# Patient Record
Sex: FEMALE | Race: WHITE | Employment: UNEMPLOYED | URBAN - NONMETROPOLITAN AREA
[De-identification: names, ages, dates, MRNs, and addresses within clinical notes are randomized per-mention and may not be internally consistent; named-entity substitution may affect disease eponyms.]

---

## 2022-09-21 ENCOUNTER — HOSPITAL ENCOUNTER (OUTPATIENT)
Dept: PHYSICAL THERAPY | Age: 77
Setting detail: THERAPIES SERIES
Discharge: HOME | End: 2022-09-21
Payer: MEDICAID

## 2022-09-21 PROCEDURE — 97140 MANUAL THERAPY 1/> REGIONS: CPT

## 2022-09-21 PROCEDURE — 97162 PT EVAL MOD COMPLEX 30 MIN: CPT

## 2022-09-21 NOTE — PROGRESS NOTES
(cm): 64.8  Mid Knee (cm): 61.6  Thigh (cm): 82.4  Total Girth (cm): 276.6         Functional Outcomes       The Lower Extremity Functional Scale  Any of your usual work, housework, or school activities: Quite a Bit of Difficulty  Your usual hobbies, recreational, or sporting activities: Quite a Bit of Difficulty  Getting into or out of the bath: Extreme Difficulty or Unable to Perform Activity  Walking between rooms: Extreme Difficulty or Unable to Perform Activity  Putting on your shoes or socks: Quite a Bit of Difficulty  Squatting: Extreme Difficulty or Unable to Perform Activity  Lifting an object, like a bag of groceries from the floor: Extreme Difficulty or Unable to Perform Activity  Performing light activities around your home: Extreme Difficulty or Unable to Perform Activity  Performing heavy activities around your home: Extreme Difficulty or Unable to Perform Activity  Getting into or out of a car: Extreme Difficulty or Unable to Perform Activity  Walking 2 blocks: Extreme Difficulty or Unable to Perform Activity  Walking a mile: Extreme Difficulty or Unable to Perform Activity  Going up or down 10 stairs (about 1 flight of stairs): Extreme Difficulty or Unable to Perform Activity  Standing for 1 hour: Extreme Difficulty or Unable to Perform Activity  Sitting for 1 hour: Moderate Difficulty  Running on even ground : Extreme Difficulty or Unable to Perform Activity  Running on uneven ground : Extreme Difficulty or Unable to Perform Activity  Making sharp turns while running fast : Extreme Difficulty or Unable to Perform Activity  Hopping: Extreme Difficulty or Unable to Perform Activity  Rolling over in bed: Quite a Bit of Difficulty  LEFS Total Score: 6      Exercises:  Exercise 1: HEP pt educated on keeping her legs elevated and compressed at 30mmHg, perform gentle exercises daily including ankle pumps, and reducing sodium and sugar intake    Manual:        Skin Integumentary  Skin Condition/Temp: Dry  Skin Integumentary  Skin Condition/Temp: Dry      Assessment  Assessment: Pt is a 68year old female that has a chronic history of BLE lymphedema with neuropathy and history of cellulitis. Pt will benefit from skilled PT in order to address these deficits. Therapy Prognosis: Fair        Decision Making: Medium Complexity    Patient Education  Patient Education: pt educated on her POC and HEP  Pt verbalized/demonstrated good understanding:     [x] Yes         [] No, pt required further clarification.          Goals  Short Term Goals  Time Frame for Short term goals: 3 weeks  Short term goal 1: Pt will be educated on her POC and HEP-met  Short term goal 2: Pt will initiate pump protocol    Long Term Goals  Time Frame for Long term goals : 6 weeks  Long term goal 1: Pt will be safe and independent with lymphedema management  Long term goal 2: Pt will demonstrate a 2-3cm decrease in BLE girth measurements in order to reduce any new or worsening skin conditions  Long term goal 3: Pt will be fitted for costume compression in order to management lymphedema  Long term goal 4: Pt will be fitted for an at home pump in order t o reduce BLE girth measurements and reduce pain      Patient goals : \"to decrease BLE swelling and reduce pain\"        Minutes Tracking:  Time In: 1304  Time Out: 1350  Minutes: 46  Timed Code Treatment Minutes: [address] [facility], PT, DPT   Date: 9/21/2022

## 2022-09-21 NOTE — PLAN OF CARE
[facility]           Phone: [phone]             Outpatient Physical Therapy  Fax: [phone]                                           Date: 2022  Patient: Rico Baker : 1945 CSN #: [id number]   Referring Physician: Brandi Avila DO      [x] Plan of Care   [] Updated Plan of Care    Dates of Service to Include: 2022 to 22    Diagnosis:  BLE lymphedema    Rehab (Treatment) Diagnosis:  BLE lymphedema             Onset Date:  22    Attendance  Total # of Visits to Date: 1        Assessment  Assessment: Pt is a 68year old female that has a chronic history of BLE lymphedema with neuropathy and history of cellulitis. Pt will benefit from skilled PT in order to address these deficits.       Goals  Short Term Goals  Time Frame for Short term goals: 3 weeks  Short term goal 1: Pt will be educated on her POC and HEP-met  Short term goal 2: Pt will initiate pump protocol  Long Term Goals  Time Frame for Long term goals : 6 weeks  Long term goal 1: Pt will be safe and independent with lymphedema management  Long term goal 2: Pt will demonstrate a 2-3cm decrease in BLE girth measurements in order to reduce any new or worsening skin conditions  Long term goal 3: Pt will be fitted for costume compression in order to management lymphedema  Long term goal 4: Pt will be fitted for an at home pump in order t o reduce BLE girth measurements and reduce pain     Prognosis  Therapy Prognosis: Fair    Treatment Plan   Plan Frequency: 2x/wk  Plan weeks: 4-6 weeks  [] HP/CP      [] Electrical Stim   [x] Therapeutic Exercise      [] Gait Training  [] Aquatics   [] Ultrasound         [x] Patient Education/HEP   [x] Manual Therapy  [] Traction    [] Neuro-gildardo        [] Soft Tissue Mobs            [] Home TENS  [] Iontophoresis    [] Orthotic casting/fitting      [x] Lymphedema              Electronically signed by: Adela Busch PT, DPT    Date: 9/21/2022      ______________________________________ Date: 9/21/2022   Physician Signature

## 2022-10-04 ENCOUNTER — HOSPITAL ENCOUNTER (OUTPATIENT)
Dept: PHYSICAL THERAPY | Age: 77
Setting detail: THERAPIES SERIES
Discharge: HOME | End: 2022-10-04
Payer: MEDICAID

## 2022-10-04 PROCEDURE — 97110 THERAPEUTIC EXERCISES: CPT

## 2022-10-04 PROCEDURE — 97140 MANUAL THERAPY 1/> REGIONS: CPT

## 2022-10-04 NOTE — PROGRESS NOTES
Phone: Andrei           Fax: [phone]                           Outpatient Physical Therapy                                                                            Daily Note    Patient: Hellen Burgess : 1945  CSN #: [id number]   Referring Physician: Marleni Pandya DO    Date: 10/4/2022       Treatment Diagnosis: BLE lymphedema    Onset Date: 22  PT Insurance Information: Medicaid  Total # of Visits Approved: 18 Per Physician Order  Total # of Visits to Date: 2  No Show: 0  Canceled Appointment: 0      Pre-Treatment Pain:  0/10  Subjective: Pt. denies pain upon arrival, reports she notices L LE swells more than R at times. Exercises:  Exercise 1: HEP pt educated on keeping her legs elevated and compressed at 30mmHg, perform gentle exercises daily including ankle pumps, and reducing sodium and sugar intake    Manual:  Soft Tissue Mobilizaton: MLD to B LEs    Modalities:       Assessment  Assessment: MLD to B LEs followed by compression pumps for 45' at 30mmHg. Pt. reports having pain/increased pressure in L thight region with increased time seated in chair. Activity Tolerance  Activity Tolerance: Patient tolerated treatment well    Patient Education  Patient Education: educated on pump protocol  Pt verbalized/demonstrated good understanding:     [x] Yes         [] No, pt required further clarification.        Post Treatment Pain:  110      Plan  Plan Frequency: 2x/wk  Plan weeks: 4-6 weeks       Goals  (Total # of Visits to Date: 2)      Short Term Goals  Time Frame for Short Term Goals: 3 weeks  Short Term Goal 1: Pt will be educated on her POC and HEP-met  Short Term Goal 2: Pt will initiate pump protocol    Long Term Goals  Time Frame for Long Term Goals : 6 weeks  Long Term Goal 1: Pt will be safe and independent with lymphedema management  Long Term Goal 2: Pt will demonstrate a 2-3cm decrease in BLE girth measurements in order to reduce any new or worsening skin conditions  Long Term Goal 3: Pt will be fitted for costume compression in order to management lymphedema  Long Term Goal 4: Pt will be fitted for an at home pump in order t o reduce BLE girth measurements and reduce pain    Minutes Tracking:  Time In: 1404  Time Out: [address]  Minutes: [address], PTA     Date: 10/4/2022

## 2022-10-12 ENCOUNTER — APPOINTMENT (OUTPATIENT)
Dept: PHYSICAL THERAPY | Age: 77
End: 2022-10-12
Payer: MEDICARE

## 2022-10-13 ENCOUNTER — HOSPITAL ENCOUNTER (OUTPATIENT)
Dept: PHYSICAL THERAPY | Age: 77
Setting detail: THERAPIES SERIES
Discharge: HOME | End: 2022-10-13
Payer: MEDICAID

## 2022-10-13 PROCEDURE — 97140 MANUAL THERAPY 1/> REGIONS: CPT

## 2022-10-13 PROCEDURE — 97110 THERAPEUTIC EXERCISES: CPT

## 2022-10-13 NOTE — PROGRESS NOTES
Phone: [address] [facility]          Fax: [phone]                      Outpatient Physical Therapy                                                             Lymphedema Treatment  Date: 10/13/2022  Patient: Hellen Burgess  : 1945  [facility] #: [id number]  Referring Physician: Marleni Pandya DO          Treatment Diagnosis: BLE lymphedema  Onset Date: 22  PT Insurance Information: Medicaid  Total # of Visits Approved: 18   Total # of Visits to Date: 3  No Show: 0  Canceled Appointment: 0     Subjective  Subjective: Pt reports B LE are achy today, states she notices it the most when the weather changes.        Lymph Assessment  Skin Integumentary:   Skin Condition/Temp: Dry  Edema Rebound: Fibrotic Tissue  Stemmer Sign: Positive    Signs of Constriction (if applicable):   Papilloma (benign tumor arising from an epithelial layer): No  Fibrotic Areas: Yes  Lymphorrhea: No    Stage of Lymphedema: Stage 2: Spontaneously Irreversible Stage  Description:      Lymphedema Classification:   Type: Secondary Lymphedema  Left: Moderate     Right: Moderate        Exercises:  Exercise 1: HEP pt educated on keeping her legs elevated and compressed at 30mmHg, perform gentle exercises daily including ankle pumps, and reducing sodium and sugar intake    Manual:  Soft Tissue Mobilizaton: MLD to B LEs     Skin Integumentary  Skin Condition/Temp: Dry  RLE Complete Decongestion Therapy  Lymph Drainage Pattern: Lower extremity  Compression Technique: Vaso-pneumatic pump  Force (mmHg): 35 mmHg  Duration: 40 minutes  LLE Complete Decongestion Therapy  Lymph Drainage Pattern: Lower extremity  Compression Technique: Vaso-pneumatic pump  Force (mmHg): 35 mmHg  Duration : 40 minutes  Other Decongestion Therapy  Force (mmHg): 35 mmHg  Duration : 40 minutes  Skin Integumentary  Skin Condition/Temp: Dry  Other Decongestion Therapy  Force (mmHg): 35 mmHg  Duration : 40 minutes      Assessment  Assessment: MLD to B LE followed by compression pumps at 35 mmHg for 40 min with chamber 6 & 7 turned to 25 d/t increased L knee pain. Continue to progress as pt tolerates. Therapy Prognosis: Fair        Decision Making: Medium Complexity    Patient Education  Patient Education: Elevation of LE. Pt verbalized/demonstrated good understanding:     [x] Yes         [] No, pt required further clarification.          Goals  Short Term Goals  Time Frame for Short Term Goals: 3 weeks  Short Term Goal 1: Pt will be educated on her POC and HEP-met  Short Term Goal 2: Pt will initiate pump protocol-met    Long Term Goals  Time Frame for Long Term Goals : 6 weeks  Long Term Goal 1: Pt will be safe and independent with lymphedema management- in progress  Long Term Goal 2: Pt will demonstrate a 2-3cm decrease in BLE girth measurements in order to reduce any new or worsening skin conditions  Long Term Goal 3: Pt will be fitted for custom compression in order to management lymphedema  Long Term Goal 4: Pt will be fitted for an at home pump in order t o reduce BLE girth measurements and reduce pain      Patient Goals : \"to decrease BLE swelling and reduce pain\"        Minutes Tracking:  Time In: 1345  Time Out: [address]  Minutes: [address], PTA  Date: 10/13/2022

## 2022-10-14 ENCOUNTER — HOSPITAL ENCOUNTER (OUTPATIENT)
Dept: PHYSICAL THERAPY | Age: 77
Setting detail: THERAPIES SERIES
Discharge: HOME | End: 2022-10-14
Payer: MEDICAID

## 2022-10-14 PROCEDURE — 97140 MANUAL THERAPY 1/> REGIONS: CPT

## 2022-10-14 PROCEDURE — 97110 THERAPEUTIC EXERCISES: CPT

## 2022-10-14 NOTE — PROGRESS NOTES
Phone: [address]          Fax: [phone]                      Outpatient Physical Therapy                                                             Lymphedema Treatment  Date: 10/14/2022  Patient: Homar Contreras  : 1945  [facility] #: [id number]  Referring Physician: Rossy Butterfield DO          Treatment Diagnosis: BLE lymphedema  Onset Date: 22  PT Insurance Information: Medicaid  Total # of Visits Approved: 18   Total # of Visits to Date: 4  No Show: 0  Canceled Appointment: 0     Subjective  Subjective: Pt reports achyness in B LE today, states she has more feeling in her R LE today.        Lymph Assessment    Skin Integumentary:   Skin Texture: Rough  Skin Condition/Temp: Dry  Edema Rebound: Fibrotic Tissue  Stemmer Sign: Positive    Signs of Constriction (if applicable):   Papilloma (benign tumor arising from an epithelial layer): No  Fibrotic Areas: Yes  Lymphorrhea: No    Stage of Lymphedema: Stage 2: Spontaneously Irreversible Stage  Description:      Lymphedema Classification:   Type: Secondary Lymphedema  Left: Moderate     Right: Moderate      Exercises:  Exercise 1: HEP pt educated on keeping her legs elevated and compressed at 30mmHg, perform gentle exercises daily including ankle pumps, and reducing sodium and sugar intake    Manual:  Soft Tissue Mobilizaton: MLD to B LEs     Skin Integumentary  Skin Condition/Temp: Dry  RLE Complete Decongestion Therapy  Scale: Stage 2  Lymph Drainage Pattern: Lower extremity  Compression Technique: Vaso-pneumatic pump  Force (mmHg): 30 mmHg  Duration: 60 minutes  LLE Complete Decongestion Therapy  Scale: Stage 2  Lymph Drainage Pattern: Lower extremity  Compression Technique: Vaso-pneumatic pump  Force (mmHg): 30 mmHg  Duration : 60 minutes  Other Decongestion Therapy  Force (mmHg): 30 mmHg  Duration : 60 minutes  Skin Integumentary  Skin Condition/Temp: Dry  Other Decongestion Therapy  Force (mmHg): 30 mmHg  Duration : 60 minutes      Assessment  Assessment: MLD to B LE followed by compression pumps at 30 mmHg for 60 mins. Pt with good tolerance to pumps this visit, no increase in pain. Therapy Prognosis: Fair        Decision Making: Medium Complexity    Patient Education  Patient Education: Pump settings  Pt verbalized/demonstrated good understanding:     [x] Yes         [] No, pt required further clarification.          Goals  Short Term Goals  Time Frame for Short Term Goals: 3 weeks  Short Term Goal 1: Pt will be educated on her POC and HEP-met  Short Term Goal 2: Pt will initiate pump protocol-met    Long Term Goals  Time Frame for Long Term Goals : 6 weeks  Long Term Goal 1: Pt will be safe and independent with lymphedema management- in progress  Long Term Goal 2: Pt will demonstrate a 2-3cm decrease in BLE girth measurements in order to reduce any new or worsening skin conditions - progressing  Long Term Goal 3: Pt will be fitted for custom compression in order to management lymphedema  Long Term Goal 4: Pt will be fitted for an at home pump in order t o reduce BLE girth measurements and reduce pain      Patient Goals : \"to decrease BLE swelling and reduce pain\"        Minutes Tracking:  Time In: 1249  Time Out: [address]  Minutes: [address], PTA   Date: 10/14/2022

## 2022-10-18 ENCOUNTER — HOSPITAL ENCOUNTER (OUTPATIENT)
Dept: PHYSICAL THERAPY | Age: 77
Setting detail: THERAPIES SERIES
Discharge: HOME | End: 2022-10-18
Payer: MEDICAID

## 2022-10-18 PROCEDURE — 97140 MANUAL THERAPY 1/> REGIONS: CPT

## 2022-10-18 PROCEDURE — 97110 THERAPEUTIC EXERCISES: CPT

## 2022-10-18 NOTE — PROGRESS NOTES
Phone: [phone]          Fax: [phone]                      Outpatient Physical Therapy                                                             Lymphedema treatment  Date: 10/18/2022  Patient: Brooke De Leon  : 1945  [facility] #: [id number]  Referring Physician: Will Adair DO     Diagnosis: BLE lymphedema    Treatment Diagnosis: BLE lymphedema  PT Insurance Information: Medicaid  Total # of Visits Approved: 18   Total # of Visits to Date: 5     Subjective  Subjective: Pt reports her legs feel better after treatment but after two days they start to ache again  Additional Pertinent Hx: depression, type 2 diabetes, anxiety, hypothyroidism, mitral valve disorder, obesity, hyperlipidemia, a fib, OA, CHF    Lymph Assessment    Skin Integumentary:   Skin Color: Ecchymosis (comment)  Skin Texture: Rough  Edema Rebound: Fibrotic Tissue  Stemmer Sign: Positive    Signs of Constriction (if applicable):   Papilloma (benign tumor arising from an epithelial layer): No  Fibrotic Areas: Yes  Lymphorrhea: No    Stage of Lymphedema: Stage 2: Spontaneously Irreversible Stage      Lymphedema Classification:   Type: Secondary Lymphedema  Left: Moderate     Right: Moderate        Exercises:  Exercise 1: HEP pt educated on keeping her legs elevated and compressed at 30mmHg, perform gentle exercises daily including ankle pumps, and reducing sodium and sugar intake    Manual:        Skin Integumentary  Skin Color: Ecchymosis (comment)  RLE Complete Decongestion Therapy  Lymph Drainage Pattern: Lower extremity (MLD to R LE)  Force (mmHg): 30 mmHg  Duration: 60 minutes  LLE Complete Decongestion Therapy  Lymph Drainage Pattern: Lower extremity (MLD to L LE)  Force (mmHg): 30 mmHg  Duration : 60 minutes  Other Decongestion Therapy  Force (mmHg): 30 mmHg  Duration : 60 minutes  Skin Integumentary  Skin Color: Ecchymosis (comment)  Other Decongestion Therapy  Force (mmHg): 30 mmHg  Duration : 60 minutes      Assessment  Assessment: Pt had increased skin movement prior to MLd and pumps this date, edema noticably down. PT performed MLD to BLE follow by pumps at 30mmHg for 60 minutes  Therapy Prognosis: Fair        Decision Making: Medium Complexity    Patient Education  Patient Education: reviewed ankle pumps  Pt verbalized/demonstrated good understanding:     [x] Yes         [] No, pt required further clarification.          Goals  Short Term Goals  Time Frame for Short Term Goals: 3 weeks  Short Term Goal 1: Pt will be educated on her POC and HEP-met  Short Term Goal 2: Pt will initiate pump protocol-met    Long Term Goals  Time Frame for Long Term Goals : 6 weeks  Long Term Goal 1: Pt will be safe and independent with lymphedema management- in progress  Long Term Goal 2: Pt will demonstrate a 2-3cm decrease in BLE girth measurements in order to reduce any new or worsening skin conditions - progressing  Long Term Goal 3: Pt will be fitted for custom compression in order to management lymphedema  Long Term Goal 4: Pt will be fitted for an at home pump in order t o reduce BLE girth measurements and reduce pain      Patient Goals : \"to decrease BLE swelling and reduce pain\"        Minutes Tracking:  Time In: 1124  Time Out: 1247  Minutes: 83  Timed Code Treatment Minutes: [address] [facility], PT, DPT   Date: 10/18/2022

## 2022-10-21 ENCOUNTER — HOSPITAL ENCOUNTER (OUTPATIENT)
Dept: PHYSICAL THERAPY | Age: 77
Setting detail: THERAPIES SERIES
Discharge: HOME | End: 2022-10-21
Payer: MEDICAID

## 2022-10-21 PROCEDURE — 97110 THERAPEUTIC EXERCISES: CPT

## 2022-10-21 PROCEDURE — 97140 MANUAL THERAPY 1/> REGIONS: CPT

## 2022-10-21 NOTE — PROGRESS NOTES
Phone: [address]          Fax: [phone]                      Outpatient Physical Therapy                                                             Lymphedema Treatment  Date: 10/21/2022  Patient: Saurabh Braswell  : 1945  [facility] #: [id number]  Referring Physician: Alexandre Davis DO          Treatment Diagnosis: BLE lymphedema  Onset Date: 22  PT Insurance Information: Medicaid  Total # of Visits Approved: 18   Total # of Visits to Date: 6  No Show: 0  Canceled Appointment: 0     Subjective  Subjective: Pt reports feeling good today, states her legs feel better afte each treatment. Lymph Assessment  Skin Integumentary:   Skin Color: Ecchymosis (comment)  Skin Texture: Rough  Skin Condition/Temp: Dry  Edema Rebound: Fibrotic Tissue  Stemmer Sign: Positive    Signs of Constriction (if applicable):   Papilloma (benign tumor arising from an epithelial layer): No  Fibrotic Areas: Yes  Lymphorrhea: No    Stage of Lymphedema: Stage 2: Spontaneously Irreversible Stage  Description:      Lymphedema Classification:   Type: Secondary Lymphedema  Left: Moderate     Right: Moderate    Measurements: Area Measured: R LE, L LE (R LE 3\" above 42.2 cm, 6\" above 56 cm.  L LE 3\" above 42 cm, 6\" above 56.2 cm.)      Right Measurements Left Measurements   R LE Pre Girth Measurement (cm)  5th Tuberosity (cm): 26.7  Lateral malleolus: 32.7  Calf (cm): 63  Mid Knee (cm): 65  Thigh (cm): 76.9  Total Girth (cm): 264.3 L LE Pre Girth Measurement (cm)  5th Tuberosity (cm): 27.4  Lateral malleolus: 34.5  Calf (cm): 63.5  Mid Knee (cm): 60.5  Thigh (cm): 73.6  Total Girth (cm): 259.5         Exercises:  Exercise 1: HEP pt educated on keeping her legs elevated and compressed at 30mmHg, perform gentle exercises daily including ankle pumps, and reducing sodium and sugar intake    Manual:  Soft Tissue Mobilizaton: MLD to B LEs     Skin Integumentary  Skin Color: Ecchymosis (comment)  Skin Condition/Temp: Dry  RLE Complete Decongestion Therapy  Scale: Stage 2  Lymph Drainage Pattern: Lower extremity  Compression Technique: Vaso-pneumatic pump  Force (mmHg): 30 mmHg  Duration: 60 minutes  LLE Complete Decongestion Therapy  Scale: Stage 2  Lymph Drainage Pattern: Lower extremity  Compression Technique: Vaso-pneumatic pump  Force (mmHg): 30 mmHg  Duration : 60 minutes  Other Decongestion Therapy  Force (mmHg): 30 mmHg  Duration : 60 minutes  Skin Integumentary  Skin Color: Ecchymosis (comment)  Skin Condition/Temp: Dry  Other Decongestion Therapy  Force (mmHg): 30 mmHg  Duration : 60 minutes      Assessment  Assessment: MLD to B LE followed by pumps at 30 mmHg for 60 min. Pt declined increasing pumps this visit. Pt with increased skin movement during and after MLD this visit. Therapy Prognosis: Fair        Decision Making: Medium Complexity    Patient Education  Patient Education: measurements  Pt verbalized/demonstrated good understanding:     [x] Yes         [] No, pt required further clarification.          Goals  Short Term Goals  Time Frame for Short Term Goals: 3 weeks  Short Term Goal 1: Pt will be educated on her POC and HEP-met  Short Term Goal 2: Pt will initiate pump protocol-met    Long Term Goals  Time Frame for Long Term Goals : 6 weeks  Long Term Goal 1: Pt will be safe and independent with lymphedema management- in progress  Long Term Goal 2: Pt will demonstrate a 2-3cm decrease in BLE girth measurements in order to reduce any new or worsening skin conditions - progressing  Long Term Goal 3: Pt will be fitted for custom compression in order to management lymphedema  Long Term Goal 4: Pt will be fitted for an at home pump in order t o reduce BLE girth measurements and reduce pain      Patient Goals : \"to decrease BLE swelling and reduce pain\"        Minutes Tracking:  Time In: 1255  Time Out: 1427  Minutes: 80       Delmar Mcfadden PTA   Date: 10/21/2022

## 2022-10-25 ENCOUNTER — HOSPITAL ENCOUNTER (OUTPATIENT)
Dept: PHYSICAL THERAPY | Age: 77
Setting detail: THERAPIES SERIES
Discharge: HOME | End: 2022-10-25
Payer: MEDICARE

## 2022-10-25 PROCEDURE — 97140 MANUAL THERAPY 1/> REGIONS: CPT

## 2022-10-25 PROCEDURE — 97110 THERAPEUTIC EXERCISES: CPT

## 2022-10-25 NOTE — PROGRESS NOTES
Phone: [phone]                       [facility]          Fax: [phone]                      Outpatient Physical Therapy                                                             Lymphedema treatment  Date: 10/25/2022  Patient: Linda Ochoa  : 1945  CSN #: [id number]  Referring Physician: Shivani Bowman DO     Diagnosis: BLE lymphedema    Treatment Diagnosis: BLE lymphedema  Onset Date: 22  PT Insurance Information: Medicaid  Total # of Visits Approved: 18   Total # of Visits to Date: 7     Subjective  Subjective: Pt reported she had a lot of pain inher legs today that started last night.  Pt report pins and needle feeling in her legs from her neuropathy  Additional Pertinent Hx: depression, type 2 diabetes, anxiety, hypothyroidism, mitral valve disorder, obesity, hyperlipidemia, a fib, OA, CHF    Lymph Assessment    Skin Integumentary:   Skin Color: Ecchymosis (comment)  Skin Texture: Rough  Skin Condition/Temp: Dry  Edema Rebound: Quick  Stemmer Sign: Positive    Signs of Constriction (if applicable):   Papilloma (benign tumor arising from an epithelial layer): No  Fibrotic Areas: Yes  Lymphorrhea: No    Stage of Lymphedema: Stage 2: Spontaneously Irreversible Stage  Description:      Lymphedema Classification:   Type: Secondary Lymphedema  Left: Moderate     Right: Moderate        Exercises:  Exercise 1: HEP pt educated on keeping her legs elevated and compressed at 30mmHg, perform gentle exercises daily including ankle pumps, and reducing sodium and sugar intake    Manual:        Skin Integumentary  Skin Color: Ecchymosis (comment)  Skin Condition/Temp: Dry  RLE Complete Decongestion Therapy  Lymph Drainage Pattern: Lower extremity (MLD to R LE)  Compression Technique: Vaso-pneumatic pump  Force (mmHg): 35 mmHg  Duration: 60 minutes  LLE Complete Decongestion Therapy  Lymph Drainage Pattern: Lower extremity (MLD to L LE)  Compression Technique: Vaso-pneumatic pump  Force (mmHg): 35 mmHg  Duration : 60 minutes  Other Decongestion Therapy  Force (mmHg): 35 mmHg  Duration : 60 minutes  Skin Integumentary  Skin Color: Ecchymosis (comment)  Skin Condition/Temp: Dry  Other Decongestion Therapy  Force (mmHg): 35 mmHg  Duration : 60 minutes      Assessment  Assessment: Pt presents with a decrease in fibrotic tissue in BLE, edema still noted but pt has no pitting and good skin movement. MLD performed to BLE, follow by pumps at 35mmHg for 60 minutes with good tolerance  Therapy Prognosis: Fair        Decision Making: Medium Complexity    Patient Education  Patient Education: educated on skin improvements  Pt verbalized/demonstrated good understanding:     [x] Yes         [] No, pt required further clarification.          Goals  Short Term Goals  Time Frame for Short Term Goals: 3 weeks  Short Term Goal 1: Pt will be educated on her POC and HEP-met  Short Term Goal 2: Pt will initiate pump protocol-met    Long Term Goals  Time Frame for Long Term Goals : 6 weeks  Long Term Goal 1: Pt will be safe and independent with lymphedema management- in progress  Long Term Goal 2: Pt will demonstrate a 2-3cm decrease in BLE girth measurements in order to reduce any new or worsening skin conditions - progressing  Long Term Goal 3: Pt will be fitted for custom compression in order to management lymphedema  Long Term Goal 4: Pt will be fitted for an at home pump in order t o reduce BLE girth measurements and reduce pain      Patient Goals : \"to decrease BLE swelling and reduce pain\"        Minutes Tracking:  Time In: 1130  Time Out: 1247  Minutes: 77  Timed Code Treatment Minutes: 75 Minutes    Gwendlyn Bernheim, PT, DPT   Date: 10/25/2022

## 2022-10-28 ENCOUNTER — HOSPITAL ENCOUNTER (OUTPATIENT)
Dept: PHYSICAL THERAPY | Age: 77
Setting detail: THERAPIES SERIES
Discharge: HOME | End: 2022-10-28
Payer: MEDICARE

## 2022-10-28 PROCEDURE — 97110 THERAPEUTIC EXERCISES: CPT

## 2022-10-28 PROCEDURE — 97140 MANUAL THERAPY 1/> REGIONS: CPT

## 2022-10-28 NOTE — PROGRESS NOTES
Phone: [address]          Fax: [phone]                      Outpatient Physical Therapy                                                             Lymphedema Treatment  Date: 10/28/2022  Patient: Lavern Moyer  : 1945  CSN #: [id number]  Referring Physician: Yong Lawrence DO          Treatment Diagnosis: BLE lymphedema  Onset Date: 22  PT Insurance Information: Medicaid  Total # of Visits Approved: 18   Total # of Visits to Date: 8  No Show: 0  Canceled Appointment: 0     Subjective  Subjective: Pt reports 3-4/10 B LE pain, stating the pins and needles in her feet is staying the same now. Pt states she has noticed that her legs feel softer than they were prior to starting therapy. Pt also reports only taking pain medication for LE ~1x a week compared to every day or every other day prior to therapy.        Lymph Assessment    Skin Integumentary:   Skin Texture: Rough  Edema Rebound: Quick  Stemmer Sign: Positive    Signs of Constriction (if applicable):   Papilloma (benign tumor arising from an epithelial layer): No  Fibrotic Areas: Yes  Lymphorrhea: No    Stage of Lymphedema: Stage 2: Spontaneously Irreversible Stage  Description:      Lymphedema Classification:   Type: Secondary Lymphedema  Left: Moderate     Right: Moderate        Exercises:  Exercise 1: HEP pt educated on keeping her legs elevated and compressed at 30mmHg, perform gentle exercises daily including ankle pumps, and reducing sodium and sugar intake    Manual:  Soft Tissue Mobilizaton: MLD to B LEs     RLE Complete Decongestion Therapy  Scale: Stage 2  Lymph Drainage Pattern: Lower extremity  Compression Technique: Vaso-pneumatic pump  Force (mmHg): 35 mmHg  Duration: 60 minutes  LLE Complete Decongestion Therapy  Scale: Stage 2  Lymph Drainage Pattern: Lower extremity  Compression Technique: Vaso-pneumatic pump  Force (mmHg): 35 mmHg  Duration : 60 minutes  Other Decongestion Therapy  Force (mmHg): 35 mmHg  Duration : 60 minutes  Other Decongestion Therapy  Force (mmHg): 35 mmHg  Duration : 60 minutes      Assessment  Assessment: Pt with good skin movement prior to tx, MLD to B LE. Attempted to increase pumps to 40 mmHg, pt able to tolerate for 10 min before requesting to decrease to 35 mmHg for 50 min. Pt requested to end session early d/t pain in R foot/ankle. Therapy Prognosis: Fair        Decision Making: Medium Complexity    Patient Education  Patient Education: Skin movement  Pt verbalized/demonstrated good understanding:     [x] Yes         [] No, pt required further clarification.          Goals  Short Term Goals  Time Frame for Short Term Goals: 3 weeks  Short Term Goal 1: Pt will be educated on her POC and HEP-met  Short Term Goal 2: Pt will initiate pump protocol-met    Long Term Goals  Time Frame for Long Term Goals : 6 weeks  Long Term Goal 1: Pt will be safe and independent with lymphedema management- in progress  Long Term Goal 2: Pt will demonstrate a 2-3cm decrease in BLE girth measurements in order to reduce any new or worsening skin conditions - progressing  Long Term Goal 3: Pt will be fitted for custom compression in order to management lymphedema-progressing  Long Term Goal 4: Pt will be fitted for an at home pump in order t o reduce BLE girth measurements and reduce pain- progressing      Patient Goals : \"to decrease BLE swelling and reduce pain\"        Minutes Tracking:  Time In: 1305  Time Out: 1418  Minutes: 68       Lynette Beth, JAMA   Date: 10/28/2022

## 2022-11-02 ENCOUNTER — HOSPITAL ENCOUNTER (OUTPATIENT)
Dept: PHYSICAL THERAPY | Age: 77
Setting detail: THERAPIES SERIES
Discharge: HOME | End: 2022-11-02
Payer: MEDICARE

## 2022-11-02 PROCEDURE — 97140 MANUAL THERAPY 1/> REGIONS: CPT

## 2022-11-02 PROCEDURE — 97110 THERAPEUTIC EXERCISES: CPT

## 2022-11-02 NOTE — PROGRESS NOTES
Phone: [address]          Fax: [phone]                      Outpatient Physical Therapy                                                             Lymphedema Treatment  Date: 2022  Patient: Jerson Jeff  : 1945  [facility] #: [id number]  Referring Physician: Nathalia Red DO          Treatment Diagnosis: BLE lymphedema  Onset Date: 22  PT Insurance Information: Medicaid  Total # of Visits Approved: 18   Total # of Visits to Date: 9  No Show: 0  Canceled Appointment: 0     Subjective  Subjective: Pt states she has started to notice a difference in her legs. Pt reports the pain is better and her R foot is not as swollen today as it has been.        Lymph Assessment    Skin Integumentary:   Skin Color: Ecchymosis (comment)  Skin Condition/Temp: Dry  Edema Rebound: Quick  Stemmer Sign: Positive    Signs of Constriction (if applicable):   Papilloma (benign tumor arising from an epithelial layer): No  Fibrotic Areas: Yes  Lymphorrhea: No    Stage of Lymphedema: Stage 2: Spontaneously Irreversible Stage  Description:      Lymphedema Classification:   Type: Secondary Lymphedema  Left: Moderate     Right: Moderate      Exercises:  Exercise 1: HEP pt educated on keeping her legs elevated and compressed at 30mmHg, perform gentle exercises daily including ankle pumps, and reducing sodium and sugar intake    Manual:  Soft Tissue Mobilizaton: MLD to B LEs     Skin Integumentary  Skin Color: Ecchymosis (comment)  Skin Condition/Temp: Dry  RLE Complete Decongestion Therapy  Scale: Stage 2  Lymph Drainage Pattern: Lower extremity  Compression Technique: Vaso-pneumatic pump  Force (mmHg): 35 mmHg  Duration: 60 minutes  LLE Complete Decongestion Therapy  Scale: Stage 2  Lymph Drainage Pattern: Lower extremity  Compression Technique: Vaso-pneumatic pump  Force (mmHg): 35 mmHg  Duration : 60 minutes  Other Decongestion Therapy  Force (mmHg): 35 mmHg  Duration : 60 minutes  Skin Integumentary  Skin Color: Ecchymosis (comment)  Skin Condition/Temp: Dry  Other Decongestion Therapy  Force (mmHg): 35 mmHg  Duration : 60 minutes      Assessment  Assessment: Pt continues to have good skin movement and decrease in fibrotic areas. MLD to B LE followed by pumps at 35 mmHg for 60 min. Pt denies pain with tx this visit. Therapy Prognosis: Fair        Decision Making: Medium Complexity    Patient Education  Patient Education: Continued elevation and compression. Pt verbalized/demonstrated good understanding:     [x] Yes         [] No, pt required further clarification.          Goals  Short Term Goals  Time Frame for Short Term Goals: 3 weeks  Short Term Goal 1: Pt will be educated on her POC and HEP-met  Short Term Goal 2: Pt will initiate pump protocol-met    Long Term Goals  Time Frame for Long Term Goals : 6 weeks  Long Term Goal 1: Pt will be safe and independent with lymphedema management- in progress  Long Term Goal 2: Pt will demonstrate a 2-3cm decrease in BLE girth measurements in order to reduce any new or worsening skin conditions - progressing  Long Term Goal 3: Pt will be fitted for custom compression in order to management lymphedema-progressing  Long Term Goal 4: Pt will be fitted for an at home pump in order t o reduce BLE girth measurements and reduce pain- progressing      Patient Goals : \"to decrease BLE swelling and reduce pain\"        Minutes Tracking:  Time In: 1323  Time Out: [address]  Minutes: [de-identified]       Devin Thomas   Date: 11/2/2022

## 2022-11-04 ENCOUNTER — HOSPITAL ENCOUNTER (OUTPATIENT)
Dept: PHYSICAL THERAPY | Age: 77
Setting detail: THERAPIES SERIES
Discharge: HOME | End: 2022-11-04
Payer: MEDICARE

## 2022-11-04 PROCEDURE — 97140 MANUAL THERAPY 1/> REGIONS: CPT

## 2022-11-04 PROCEDURE — 97110 THERAPEUTIC EXERCISES: CPT

## 2022-11-04 NOTE — PROGRESS NOTES
Phone: [address]          Fax: [phone]                      Outpatient Physical Therapy                                                             Lymphedema Treatment  Date: 2022  Patient: Martha Martines  : 1945  [facility] #: [id number]  Referring Physician: Andrea Martin DO          Treatment Diagnosis: BLE lymphedema  Onset Date: 22  PT Insurance Information: Medicaid  Total # of Visits Approved: 18   Total # of Visits to Date: 10  No Show: 0  Canceled Appointment: 0     Subjective  Subjective: Pt denies pain, states she has noticed she is standing better when she completes transfers. Pt states she has not woken up in the middle of the night for a few weeks cause of pain.        Lymph Assessment  Skin Integumentary:   Edema Rebound: Quick  Stemmer Sign: Positive    Signs of Constriction (if applicable):   Papilloma (benign tumor arising from an epithelial layer): No  Fibrotic Areas: Yes  Lymphorrhea: No    Stage of Lymphedema: Stage 2: Spontaneously Irreversible Stage  Description:      Lymphedema Classification:   Type: Secondary Lymphedema  Left: Moderate     Right: Moderate        Exercises:  Exercise 1: HEP pt educated on keeping her legs elevated and compressed at 30mmHg, perform gentle exercises daily including ankle pumps, and reducing sodium and sugar intake    Manual:  Soft Tissue Mobilizaton: MLD to B LEs     RLE Complete Decongestion Therapy  Scale: Stage 2  Lymph Drainage Pattern: Lower extremity  Compression Technique: Vaso-pneumatic pump  Force (mmHg): 35 mmHg  Duration: 60 minutes  LLE Complete Decongestion Therapy  Scale: Stage 2  Lymph Drainage Pattern: Lower extremity  Compression Technique: Vaso-pneumatic pump  Force (mmHg): 35 mmHg  Duration : 60 minutes  Other Decongestion Therapy  Force (mmHg): 35 mmHg  Duration : 60 minutes  Other Decongestion Therapy  Force (mmHg): 35 mmHg  Duration : 60 minutes      Assessment  Assessment: Pt continues to have good skin movement and decrease in fibrotic areas. MLD to B LE followed by pumps at 35 mmHg for 60 min. Pt reports feeling better after tx, no increase in pain. Therapy Prognosis: Fair        Decision Making: Medium Complexity    Patient Education  Patient Education: HEP  Pt verbalized/demonstrated good understanding:     [x] Yes         [] No, pt required further clarification.          Goals  Short Term Goals  Time Frame for Short Term Goals: 3 weeks  Short Term Goal 1: Pt will be educated on her POC and HEP-met  Short Term Goal 2: Pt will initiate pump protocol-met    Long Term Goals  Time Frame for Long Term Goals : 6 weeks  Long Term Goal 2: Pt will demonstrate a 2-3cm decrease in BLE girth measurements in order to reduce any new or worsening skin conditions - progressing  Long Term Goal 3: Pt will be fitted for custom compression in order to management lymphedema-progressing  Long Term Goal 4: Pt will be fitted for an at home pump in order t o reduce BLE girth measurements and reduce pain- progressing      Patient Goals : \"to decrease BLE swelling and reduce pain\"        Minutes Tracking:  Time In: 1410  Time Out: [place]  Minutes: [address] [place], [place]   Date: 11/4/2022

## 2022-11-09 ENCOUNTER — HOSPITAL ENCOUNTER (OUTPATIENT)
Dept: PHYSICAL THERAPY | Age: 77
Setting detail: THERAPIES SERIES
Discharge: HOME | End: 2022-11-09
Payer: MEDICARE

## 2022-11-09 PROCEDURE — 97140 MANUAL THERAPY 1/> REGIONS: CPT

## 2022-11-09 PROCEDURE — 97110 THERAPEUTIC EXERCISES: CPT

## 2022-11-09 NOTE — PROGRESS NOTES
Phone: [address]          Fax: [phone]                      Outpatient Physical Therapy                                                             Lymphedema Treatment  Date: 2022  Patient: Td Wilson  : 1945  CSN #: [id number]  Referring Physician: Adriana Tang DO          Treatment Diagnosis: BLE lymphedema  Onset Date: 22  PT Insurance Information: Medicaid  Total # of Visits Approved: 18   Total # of Visits to Date: 11  No Show: 0  Canceled Appointment: 0     Subjective  Subjective: Pt reports B LE pain 7-8/10, stating her B knees were aching since she woke up this morning. Pt states she slept well, would be a good day without the pain.        Lymph Assessment    Skin Integumentary:   Skin Texture: Dry  Edema Rebound: Quick  Stemmer Sign: Positive    Signs of Constriction (if applicable):   Papilloma (benign tumor arising from an epithelial layer): No  Fibrotic Areas: Yes  Lymphorrhea: No    Stage of Lymphedema: Stage 2: Spontaneously Irreversible Stage  Description:      Lymphedema Classification:   Type: Secondary Lymphedema  Left: Moderate     Right: Moderate          Exercises:  Exercise 1: HEP pt educated on keeping her legs elevated and compressed at 30mmHg, perform gentle exercises daily including ankle pumps, and reducing sodium and sugar intake    Manual:  Soft Tissue Mobilizaton: MLD to B LEs     RLE Complete Decongestion Therapy  Scale: Stage 2  Lymph Drainage Pattern: Lower extremity  Compression Technique: Vaso-pneumatic pump  Force (mmHg): 35 mmHg  Duration: 60 minutes  LLE Complete Decongestion Therapy  Scale: Stage 2  Lymph Drainage Pattern: Lower extremity  Compression Technique: Vaso-pneumatic pump  Force (mmHg): 35 mmHg  Duration : 60 minutes  Other Decongestion Therapy  Force (mmHg): 35 mmHg  Duration : 60 minutes  Other Decongestion Therapy  Force (mmHg): 35 mmHg  Duration : 60 minutes      Assessment  Assessment: MLD to B LE followed by pumps at 35 mmHg for 60 min. Pt denies pain post tx, states her legs feel better. Therapy Prognosis: Fair        Decision Making: Medium Complexity    Patient Education  Patient Education: Compression  Pt verbalized/demonstrated good understanding:     [x] Yes         [] No, pt required further clarification.          Goals  Short Term Goals  Time Frame for Short Term Goals: 3 weeks  Short Term Goal 1: Pt will be educated on her POC and HEP-met  Short Term Goal 2: Pt will initiate pump protocol-met    Long Term Goals  Time Frame for Long Term Goals : 6 weeks  Long Term Goal 1: Pt will be safe and independent with lymphedema management- in progress  Long Term Goal 2: Pt will demonstrate a 2-3cm decrease in BLE girth measurements in order to reduce any new or worsening skin conditions - progressing  Long Term Goal 3: Pt will be fitted for custom compression in order to management lymphedema-progressing  Long Term Goal 4: Pt will be fitted for an at home pump in order t o reduce BLE girth measurements and reduce pain- progressing      Patient Goals : \"to decrease BLE swelling and reduce pain\"        Minutes Tracking:  Time In: 1410  Time Out: [address]  Minutes: [address]farlane, PTA   Date: 11/9/2022

## 2022-11-11 ENCOUNTER — HOSPITAL ENCOUNTER (OUTPATIENT)
Dept: PHYSICAL THERAPY | Age: 77
Setting detail: THERAPIES SERIES
Discharge: HOME | End: 2022-11-11
Payer: MEDICARE

## 2022-11-11 PROCEDURE — 97140 MANUAL THERAPY 1/> REGIONS: CPT

## 2022-11-11 PROCEDURE — 97110 THERAPEUTIC EXERCISES: CPT

## 2022-11-11 NOTE — PROGRESS NOTES
Phone: [address]          Fax: [phone]                      Outpatient Physical Therapy                                                             Lymphedema treatment  Date: 2022  Patient: Aisha Allen  : 1945  [facility] #: [id number]  Referring Physician: Jamison Josue DO     Diagnosis: BLE lymphedema    Treatment Diagnosis: BLE lymphedema  Onset Date: 22  PT Insurance Information: Medicaid  Total # of Visits Approved: 18   Total # of Visits to Date: 12     Subjective  Subjective: Pt reported the pain in her legs has become significantly better  Additional Pertinent Hx: depression, type 2 diabetes, anxiety, hypothyroidism, mitral valve disorder, obesity, hyperlipidemia, a fib, OA, CHF    Lymph Assessment      Skin Integumentary:   Skin Texture: Dry  Edema Rebound: Quick  Stemmer Sign: Positive    Signs of Constriction (if applicable):   Papilloma (benign tumor arising from an epithelial layer): No  Fibrotic Areas: No  Lymphorrhea: No    Stage of Lymphedema: Stage 2: Spontaneously Irreversible Stage  Description:      Lymphedema Classification:   Type: Secondary Lymphedema  Left: Moderate     Right: Moderate      Exercises:  Exercise 1: HEP pt educated on keeping her legs elevated and compressed at 30mmHg, perform gentle exercises daily including ankle pumps, and reducing sodium and sugar intake    Manual:        RLE Complete Decongestion Therapy  Lymph Drainage Pattern: Lower extremity (MLD to R LE)  Compression Technique: Vaso-pneumatic pump  Force (mmHg): 35 mmHg  Duration: 60 minutes  LLE Complete Decongestion Therapy  Lymph Drainage Pattern: Lower extremity (MLD to L LE)  Compression Technique: Vaso-pneumatic pump  Force (mmHg): 35 mmHg  Duration : 60 minutes  Other Decongestion Therapy  Force (mmHg): 35 mmHg  Duration : 60 minutes  Other Decongestion Therapy  Force (mmHg): 35 mmHg  Duration : 60 minutes      Assessment  Assessment: Pt is continuing to make good progress with tx, pt's legs are soft with noticable decrease in fibrotic tissue. Pt had good tolerance to both MLD and pumps this date. Pump rep is currently working with nursing home in order to provide continued tx. Therapy Prognosis: Fair        Decision Making: Medium Complexity    Patient Education  Patient Education: educated on progress  Pt verbalized/demonstrated good understanding:     [x] Yes         [] No, pt required further clarification.          Goals  Short Term Goals  Time Frame for Short Term Goals: 3 weeks  Short Term Goal 1: Pt will be educated on her POC and HEP-met  Short Term Goal 2: Pt will initiate pump protocol-met    Long Term Goals  Time Frame for Long Term Goals : 6 weeks  Long Term Goal 1: Pt will be safe and independent with lymphedema management- in progress  Long Term Goal 2: Pt will demonstrate a 2-3cm decrease in BLE girth measurements in order to reduce any new or worsening skin conditions - progressing  Long Term Goal 3: Pt will be fitted for custom compression in order to management lymphedema-progressing  Long Term Goal 4: Pt will be fitted for an at home pump in order t o reduce BLE girth measurements and reduce pain- progressing      Patient Goals : \"to decrease BLE swelling and reduce pain\"        Minutes Tracking:  Time In: 1347  Time Out: [address]  Minutes: 92  Timed Code Treatment Minutes: 90 Minutes    Dang Mendoza, PT, DPT   Date: 11/11/2022

## 2022-11-15 NOTE — PROGRESS NOTES
Phone: [address] [facility]          Fax: [phone]                      Outpatient Physical Therapy                                                             Lymphedema treatment  Date: 2022  Patient: Jennie Smith  : 1945  [facility] #: [id number]  Referring Physician: Mike Cummings DO     Diagnosis: BLE lymphedema    Treatment Diagnosis: BLE lymphedema  Onset Date: 22  PT Insurance Information: Medicaid  Total # of Visits Approved: 18   Total # of Visits to Date: 13     Subjective  Subjective: no complaints this date  Additional Pertinent Hx: depression, type 2 diabetes, anxiety, hypothyroidism, mitral valve disorder, obesity, hyperlipidemia, a fib, OA, CHF    Lymph Assessment      Skin Integumentary:   Skin Color: Ecchymosis (comment)  Skin Texture: Dry  Edema Rebound: Quick  Stemmer Sign: Positive    Signs of Constriction (if applicable):   Papilloma (benign tumor arising from an epithelial layer): No  Fibrotic Areas: No  Lymphorrhea: No    Stage of Lymphedema: Stage 2: Spontaneously Irreversible Stage  Description:      Lymphedema Classification:   Type: Secondary Lymphedema  Left: Moderate     Right: Moderate        Exercises:  Exercise 1: HEP pt educated on keeping her legs elevated and compressed at 30mmHg, perform gentle exercises daily including ankle pumps, and reducing sodium and sugar intake    Manual:        Skin Integumentary  Skin Color: Ecchymosis (comment)  RLE Complete Decongestion Therapy  Lymph Drainage Pattern: Lower extremity (MLD to R LE)  Compression Technique: Vaso-pneumatic pump  Force (mmHg): 35 mmHg  Duration: 60 minutes  LLE Complete Decongestion Therapy  Lymph Drainage Pattern: Lower extremity (MLD TO L LE)  Compression Technique: Vaso-pneumatic pump  Force (mmHg): 35 mmHg  Duration : 60 minutes  Other Decongestion Therapy  Force (mmHg): 35 mmHg  Duration : 60 minutes  Skin Integumentary  Skin Color: Ecchymosis (comment)  Other Decongestion Therapy  Force (mmHg): 35 mmHg  Duration : 60 minutes      Assessment  Assessment: PT's legs are soft this date with no hardening. MLd to BLE with good tolerance with pumps at 35mmHg for 60 minutes. Therapy Prognosis: Fair        Decision Making: Medium Complexity    Patient Education  Patient Education: educated on softening of BLE  Pt verbalized/demonstrated good understanding:     [x] Yes         [] No, pt required further clarification.          Goals  Short Term Goals  Time Frame for Short Term Goals: 3 weeks  Short Term Goal 1: Pt will be educated on her POC and HEP-met  Short Term Goal 2: Pt will initiate pump protocol-met    Long Term Goals  Time Frame for Long Term Goals : 6 weeks  Long Term Goal 1: Pt will be safe and independent with lymphedema management- in progress  Long Term Goal 2: Pt will demonstrate a 2-3cm decrease in BLE girth measurements in order to reduce any new or worsening skin conditions - progressing  Long Term Goal 3: Pt will be fitted for custom compression in order to management lymphedema-progressing  Long Term Goal 4: Pt will be fitted for an at home pump in order t o reduce BLE girth measurements and reduce pain- progressing      Patient Goals : \"to decrease BLE swelling and reduce pain\"        Minutes Tracking:  Time In: 1350  Time Out: [address]  Minutes: 92  Timed Code Treatment Minutes: 90 Minutes    Ger Mcbride PT, DPT   Date: 11/11/2022

## 2022-11-16 ENCOUNTER — HOSPITAL ENCOUNTER (OUTPATIENT)
Dept: PHYSICAL THERAPY | Age: 77
Setting detail: THERAPIES SERIES
Discharge: HOME | End: 2022-11-16
Payer: MEDICARE

## 2022-11-16 PROCEDURE — 97110 THERAPEUTIC EXERCISES: CPT

## 2022-11-16 PROCEDURE — 97140 MANUAL THERAPY 1/> REGIONS: CPT

## 2022-11-16 NOTE — PROGRESS NOTES
Phone: [address]          Fax: [phone]                      Outpatient Physical Therapy                                                             Lymphedema Treatment  Date: 2022  Patient: Jerson Jeff  : 1945  [facility] #: [id number]  Referring Physician: Nathalia Red DO          Treatment Diagnosis: BLE lymphedema  Onset Date: 22  PT Insurance Information: Medicaid  Total # of Visits Approved: 18   Total # of Visits to Date: 14  No Show: 0  Canceled Appointment: 0     Subjective  Subjective: Pt reports 4/10 pain today, states she had a lot of pain /10 monday night. Pt states she talked to MD tuesday when she saw them, and they are trying to push getting pumps for the facility.        Lymph Assessment  Skin Integumentary:   Skin Texture: Dry  Edema Rebound: Quick  Stemmer Sign: Positive    Signs of Constriction (if applicable):   Papilloma (benign tumor arising from an epithelial layer): No  Fibrotic Areas: No  Lymphorrhea: No    Stage of Lymphedema: Stage 2: Spontaneously Irreversible Stage  Description:      Lymphedema Classification:   Type: Secondary Lymphedema  Left: Moderate     Right: Moderate      Exercises:  Exercise 1: HEP pt educated on keeping her legs elevated and compressed at 30mmHg, perform gentle exercises daily including ankle pumps, and reducing sodium and sugar intake    Manual:  Soft Tissue Mobilizaton: MLD to B LEs     RLE Complete Decongestion Therapy  Scale: Stage 2  Lymph Drainage Pattern: Lower extremity  Compression Technique: Vaso-pneumatic pump  Force (mmHg): 35 mmHg  Duration: 60 minutes  LLE Complete Decongestion Therapy  Scale: Stage 2  Lymph Drainage Pattern: Lower extremity  Compression Technique: Vaso-pneumatic pump  Force (mmHg): 35 mmHg  Duration : 60 minutes  Other Decongestion Therapy  Force (mmHg): 35 mmHg  Duration : 60 minutes  Other Decongestion Therapy  Force (mmHg): 35 mmHg  Duration : 60 minutes      Assessment  Assessment: MLD to B LE followed by pumps at 35 mmHg for 60 min. Pt with good tolerance to pumps, and good skin movement post tx. Therapy Prognosis: Fair        Decision Making: Medium Complexity    Patient Education  Patient Education: HEP  Pt verbalized/demonstrated good understanding:     [x] Yes         [] No, pt required further clarification.          Goals  Short Term Goals  Time Frame for Short Term Goals: 3 weeks  Short Term Goal 1: Pt will be educated on her POC and HEP-met  Short Term Goal 2: Pt will initiate pump protocol-met    Long Term Goals  Time Frame for Long Term Goals : 6 weeks  Long Term Goal 1: Pt will be safe and independent with lymphedema management- in progress  Long Term Goal 2: Pt will demonstrate a 2-3cm decrease in BLE girth measurements in order to reduce any new or worsening skin conditions - progressing  Long Term Goal 3: Pt will be fitted for custom compression in order to management lymphedema-progressing  Long Term Goal 4: Pt will be fitted for an at home pump in order t o reduce BLE girth measurements and reduce pain- progressing      Patient Goals : \"to decrease BLE swelling and reduce pain\"        Minutes Tracking:  Time In: 1300  Time Out: [facility]  Minutes: [facility], [facility]   Date: 11/16/2022

## 2022-11-18 ENCOUNTER — HOSPITAL ENCOUNTER (OUTPATIENT)
Dept: PHYSICAL THERAPY | Age: 77
Setting detail: THERAPIES SERIES
Discharge: HOME | End: 2022-11-18
Payer: MEDICARE

## 2022-11-18 PROCEDURE — 97140 MANUAL THERAPY 1/> REGIONS: CPT

## 2022-11-18 PROCEDURE — 97110 THERAPEUTIC EXERCISES: CPT

## 2022-11-18 NOTE — PROGRESS NOTES
Phone: [address]          Fax: [phone]                      Outpatient Physical Therapy                                                             Lymphedema Treatment  Date: 2022  Patient: Vicki Newell  : 1945  [facility] #: [id number]  Referring Physician: Kimberly Trujillo DO          Treatment Diagnosis: BLE lymphedema  Onset Date: 22  PT Insurance Information: Medicaid  Total # of Visits Approved: 18   Total # of Visits to Date: 15  No Show: 0  Canceled Appointment: 0     Subjective  Subjective: Pt states she is feeling ok today, 4-5/10 LE pain today, stating \"I think it might be the weather\". Pt states she tries to keep herself busy to distract her when she has pain.        Lymph Assessment    Skin Integumentary:   Skin Texture: Dry  Edema Rebound: Quick  Stemmer Sign: Positive    Signs of Constriction (if applicable):   Papilloma (benign tumor arising from an epithelial layer): No  Fibrotic Areas: No  Lymphorrhea: No    Stage of Lymphedema: Stage 2: Spontaneously Irreversible Stage  Description:      Lymphedema Classification:   Type: Secondary Lymphedema  Left: Moderate     Right: Moderate      Exercises:  Exercise 1: HEP pt educated on keeping her legs elevated and compressed at 30mmHg, perform gentle exercises daily including ankle pumps, and reducing sodium and sugar intake    Manual:  Soft Tissue Mobilizaton: MLD to B LEs     RLE Complete Decongestion Therapy  Scale: Stage 2  Lymph Drainage Pattern: Lower extremity  Compression Technique: Vaso-pneumatic pump  Force (mmHg): 35 mmHg  Duration: 60 minutes  LLE Complete Decongestion Therapy  Scale: Stage 2  Lymph Drainage Pattern: Lower extremity  Compression Technique: Vaso-pneumatic pump  Force (mmHg): 35 mmHg  Duration : 60 minutes  Other Decongestion Therapy  Force (mmHg): 35 mmHg  Duration : 60 minutes  Other Decongestion Therapy  Force (mmHg): 35 mmHg  Duration : 60 minutes      Assessment  Assessment: MLD to B LE followed by pumps at 35 mmHg for 60 min. Pt able to SUSAN/DOFF pumps with Linda. Therapy Prognosis: Fair        Decision Making: Medium Complexity    Patient Education  Patient Education: HEP  Pt verbalized/demonstrated good understanding:     [x] Yes         [] No, pt required further clarification.          Goals  Short Term Goals  Time Frame for Short Term Goals: 3 weeks  Short Term Goal 1: Pt will be educated on her POC and HEP-met  Short Term Goal 2: Pt will initiate pump protocol-met    Long Term Goals  Time Frame for Long Term Goals : 6 weeks  Long Term Goal 1: Pt will be safe and independent with lymphedema management- in progress  Long Term Goal 2: Pt will demonstrate a 2-3cm decrease in BLE girth measurements in order to reduce any new or worsening skin conditions - progressing  Long Term Goal 3: Pt will be fitted for custom compression in order to management lymphedema-progressing  Long Term Goal 4: Pt will be fitted for an at home pump in order t o reduce BLE girth measurements and reduce pain- progressing      Patient Goals : \"to decrease BLE swelling and reduce pain\"        Minutes Tracking:  Time In: 0940  Time Out: 1107  Minutes: [address] [place], [place]   Date: 11/18/2022

## 2022-11-23 ENCOUNTER — HOSPITAL ENCOUNTER (OUTPATIENT)
Dept: PHYSICAL THERAPY | Age: 77
Setting detail: THERAPIES SERIES
Discharge: HOME | End: 2022-11-23
Payer: MEDICARE

## 2022-11-23 PROCEDURE — 97110 THERAPEUTIC EXERCISES: CPT

## 2022-11-23 PROCEDURE — 97140 MANUAL THERAPY 1/> REGIONS: CPT

## 2022-11-23 NOTE — PROGRESS NOTES
Phone: [address]          Fax: [phone]                      Outpatient Physical Therapy                                                             Lymphedema Treatment  Date: 2022  Patient: Shayan Jones  : 1945  [facility] #: [id number]  Referring Physician: Karen Davis DO          Treatment Diagnosis: BLE lymphedema  Onset Date: 22  PT Insurance Information: Medicaid  Total # of Visits Approved: 18   Total # of Visits to Date: 16  No Show: 0  Canceled Appointment: 0     Subjective  Subjective: Pt reports 4/10 B LE pain today. Pt states they were more painful this morning. Pt states her legs stay down longer now, it takes til the night after her tx that her legs start to swell again. Pt states she has noticed that her feet swell more with weather changes. Lymph Assessment    Skin Integumentary:   Skin Texture: Dry  Edema Rebound: Quick  Stemmer Sign: Positive    Signs of Constriction (if applicable):   Papilloma (benign tumor arising from an epithelial layer): No  Fibrotic Areas: No  Lymphorrhea: No    Stage of Lymphedema: Stage 2: Spontaneously Irreversible Stage  Description:      Lymphedema Classification:   Type: Secondary Lymphedema  Left: Moderate     Right: Moderate    Measurements: Area Measured: R LE, L LE (R LE: 3\" above 43.5 cm, 6\" above 55.3 cm.  L LE: 3\" above 42.2 cm, 6\" above 56.9 cm.)      Right Measurements Left Measurements   R LE Pre Girth Measurement (cm)  5th Tuberosity (cm): 25.9  Lateral malleolus: 32.5  Calf (cm): 62  Mid Knee (cm): 63.3  Thigh (cm): 82.4  Total Girth (cm): 266.1 L LE Pre Girth Measurement (cm)  5th Tuberosity (cm): 28  Lateral malleolus: 34.5  Calf (cm): 62.8  Mid Knee (cm): 62.1  Thigh (cm): 74.8  Total Girth (cm): 262.2         Exercises:  Exercise 1: HEP pt educated on keeping her legs elevated and compressed at 30mmHg, perform gentle exercises daily including ankle pumps, and reducing sodium and sugar intake    Manual:  Soft Tissue Mobilizaton: MLD to B LEs     RLE Complete Decongestion Therapy  Scale: Stage 2  Lymph Drainage Pattern: Lower extremity  Compression Technique: Vaso-pneumatic pump  Force (mmHg): 40 mmHg  Duration: 60 minutes  LLE Complete Decongestion Therapy  Scale: Stage 2  Lymph Drainage Pattern: Lower extremity  Compression Technique: Vaso-pneumatic pump  Force (mmHg): 40 mmHg  Duration : 60 minutes  Other Decongestion Therapy  Force (mmHg): 40 mmHg  Duration : 60 minutes  Other Decongestion Therapy  Force (mmHg): 40 mmHg  Duration : 60 minutes      Assessment  Assessment: MLD to B LE followed by pumps at 40 mmHg for 60 min. Measurements taken this visit, good progress being made. Pt able to tolerate increase in pressure this visit without increase in pain. Therapy Prognosis: Fair        Decision Making: Medium Complexity    Patient Education  Patient Education: Measurements  Pt verbalized/demonstrated good understanding:     [x] Yes         [] No, pt required further clarification.          Goals  Short Term Goals  Time Frame for Short Term Goals: 3 weeks  Short Term Goal 1: Pt will be educated on her POC and HEP-met  Short Term Goal 2: Pt will initiate pump protocol-met    Long Term Goals  Time Frame for Long Term Goals : 6 weeks  Long Term Goal 1: Pt will be safe and independent with lymphedema management- in progress  Long Term Goal 2: Pt will demonstrate a 2-3cm decrease in BLE girth measurements in order to reduce any new or worsening skin conditions - progressing  Long Term Goal 3: Pt will be fitted for custom compression in order to management lymphedema-progressing  Long Term Goal 4: Pt will be fitted for an at home pump in order t o reduce BLE girth measurements and reduce pain- progressing      Patient Goals : \"to decrease BLE swelling and reduce pain\"        Minutes Tracking:  Time In: 1259  Time Out: [address]  Minutes: Yanna [address]adares [address] PTA   Date: 11/23/2022

## 2022-11-29 NOTE — PROGRESS NOTES
[facility]  Inpatient/Observation/Outpatient Rehabilitation    Date: 2022  Patient Name: Liu Presley       [] Inpatient Acute/Observation       []  Outpatient  : 1945       [] Pt no showed for scheduled appointment    [] Pt refused/declined therapy at this time due to:           [x] Pt cancelled due to:  [] No Reason Given   [x] Sick/ill   [] Other:    Called to cx appointment due to therapist calling off said she was sick anyway. Therapist/Assistant will attempt to see this patient, at our earliest opportunity.        Mayito Brown Date: 2022

## 2022-11-30 ENCOUNTER — HOSPITAL ENCOUNTER (OUTPATIENT)
Dept: PHYSICAL THERAPY | Age: 77
Setting detail: THERAPIES SERIES
Discharge: HOME | End: 2022-11-30
Payer: MEDICARE

## 2022-12-02 ENCOUNTER — APPOINTMENT (OUTPATIENT)
Dept: PHYSICAL THERAPY | Age: 77
End: 2022-12-02
Payer: MEDICARE

## 2022-12-09 ENCOUNTER — HOSPITAL ENCOUNTER (OUTPATIENT)
Dept: PHYSICAL THERAPY | Age: 77
Setting detail: THERAPIES SERIES
Discharge: HOME | End: 2022-12-09
Payer: MEDICARE

## 2022-12-09 PROCEDURE — 97140 MANUAL THERAPY 1/> REGIONS: CPT

## 2022-12-09 PROCEDURE — 97110 THERAPEUTIC EXERCISES: CPT

## 2022-12-09 NOTE — PROGRESS NOTES
Phone: [address]          Fax: [phone]                      Outpatient Physical Therapy                                                             Lymphedema Treatment  Date: 2022  Patient: Saurabh Braswell  : 1945  [facility] #: [id number]  Referring Physician: Alexandre Davis DO          Treatment Diagnosis: BLE lymphedema  Onset Date: 22  PT Insurance Information: Medicaid  Total # of Visits Approved: 19   Total # of Visits to Date: 17  No Show: 0  Canceled Appointment: 5     Subjective  Subjective: Pt stated she is feeling much better than the past couple weeks. Pt states she can tell that she has not been in, her legs are more swollen and heavy than they had been.        Lymph Assessment    Skin Integumentary:   Skin Texture: Dry  Edema Rebound: Quick  Stemmer Sign: Positive    Signs of Constriction (if applicable):   Papilloma (benign tumor arising from an epithelial layer): No  Fibrotic Areas: No  Lymphorrhea: No    Stage of Lymphedema: Stage 2: Spontaneously Irreversible Stage  Description:      Lymphedema Classification:   Type: Secondary Lymphedema  Left: Moderate     Right: Moderate        Exercises:  Exercise 1: HEP pt educated on keeping her legs elevated and compressed at 30mmHg, perform gentle exercises daily including ankle pumps, and reducing sodium and sugar intake    Manual:  Soft Tissue Mobilizaton: MLD to B LEs     RLE Complete Decongestion Therapy  Scale: Stage 2  Lymph Drainage Pattern: Lower extremity  Compression Technique: Vaso-pneumatic pump  Force (mmHg): 40 mmHg  Duration: 60 minutes  LLE Complete Decongestion Therapy  Scale: Stage 2  Lymph Drainage Pattern: Lower extremity  Compression Technique: Vaso-pneumatic pump  Force (mmHg): 40 mmHg  Duration : 60 minutes  Other Decongestion Therapy  Force (mmHg): 40 mmHg  Duration : 60 minutes  Other Decongestion Therapy  Force (mmHg): 40 mmHg  Duration : 60 minutes      Assessment  Assessment: MLD to B LE followed by pumps at 40 mmHg for 60 min. Pt tolerated treatment well this visit, no increase in pain, good skin movement post tx. Therapy Prognosis: Fair        Decision Making: Medium Complexity    Patient Education  Patient Education: HEP  Pt verbalized/demonstrated good understanding:     [x] Yes         [] No, pt required further clarification.          Goals  Short Term Goals  Time Frame for Short Term Goals: 3 weeks  Short Term Goal 1: Pt will be educated on her POC and HEP-met  Short Term Goal 2: Pt will initiate pump protocol-met    Long Term Goals  Time Frame for Long Term Goals : 6 weeks  Long Term Goal 1: Pt will be safe and independent with lymphedema management- in progress  Long Term Goal 2: Pt will demonstrate a 2-3cm decrease in BLE girth measurements in order to reduce any new or worsening skin conditions - progressing  Long Term Goal 3: Pt will be fitted for custom compression in order to management lymphedema-progressing  Long Term Goal 4: Pt will be fitted for an at home pump in order t o reduce BLE girth measurements and reduce pain- progressing      Patient Goals : \"to decrease BLE swelling and reduce pain\"        Minutes Tracking:  Time In: 1415  Time Out: [address]  Minutes: 80       Tyler Raphael, PTA   Date: 12/9/2022

## 2022-12-14 ENCOUNTER — HOSPITAL ENCOUNTER (OUTPATIENT)
Dept: PHYSICAL THERAPY | Age: 77
Setting detail: THERAPIES SERIES
Discharge: HOME | End: 2022-12-14
Payer: MEDICARE

## 2022-12-14 PROCEDURE — 97110 THERAPEUTIC EXERCISES: CPT

## 2022-12-14 PROCEDURE — 97140 MANUAL THERAPY 1/> REGIONS: CPT

## 2022-12-14 NOTE — PROGRESS NOTES
Phone: [address]          Fax: [phone]                      Outpatient Physical Therapy                                                             Lymphedema Treatment  Date: 2022  Patient: Abdirahman Bustillo  : 1945  [facility] #: [id number]  Referring Physician: Sara Fonseca DO          Treatment Diagnosis: BLE lymphedema  Onset Date: 22  PT Insurance Information: Medicaid  Total # of Visits Approved: 19   Total # of Visits to Date: 18  No Show: 0  Canceled Appointment: 5     Subjective  Subjective: Pt reports 6/10 B feet pain, mostly when she is standing to complete transfers or when she just lays down. Pt states she can tell when she does not get treatment, her legs start to swell more.        Lymph Assessment    Skin Integumentary:   Skin Texture: Dry  Edema Rebound: Quick  Stemmer Sign: Positive    Signs of Constriction (if applicable):   Papilloma (benign tumor arising from an epithelial layer): No  Fibrotic Areas: No  Lymphorrhea: No    Stage of Lymphedema: Stage 2: Spontaneously Irreversible Stage  Description:      Lymphedema Classification:   Type: Secondary Lymphedema  Left: Moderate     Right: Moderate           Exercises:  Exercise 1: HEP pt educated on keeping her legs elevated and compressed at 30mmHg, perform gentle exercises daily including ankle pumps, and reducing sodium and sugar intake    Manual:  Soft Tissue Mobilizaton: MLD to B LEs     RLE Complete Decongestion Therapy  Scale: Stage 2  Lymph Drainage Pattern: Lower extremity  Compression Technique: Vaso-pneumatic pump  Force (mmHg): 40 mmHg  Duration: 60 minutes  LLE Complete Decongestion Therapy  Scale: Stage 2  Lymph Drainage Pattern: Lower extremity  Compression Technique: Vaso-pneumatic pump  Force (mmHg): 40 mmHg  Duration : 60 minutes  Other Decongestion Therapy  Force (mmHg): 40 mmHg  Duration : 60 minutes  Other Decongestion Therapy  Force (mmHg): 40 mmHg  Duration : 60 minutes      Assessment  Assessment: MLD to B LE followed by pumps at 40 mmHg for 60 min. Pt with noted decrease in edema and softness post tx. Therapy Prognosis: Fair        Decision Making: Medium Complexity    Patient Education  Patient Education: HEP  Pt verbalized/demonstrated good understanding:     [x] Yes         [] No, pt required further clarification.          Goals  Short Term Goals  Time Frame for Short Term Goals: 3 weeks  Short Term Goal 1: Pt will be educated on her POC and HEP-met  Short Term Goal 2: Pt will initiate pump protocol-met    Long Term Goals  Time Frame for Long Term Goals : 6 weeks  Long Term Goal 1: Pt will be safe and independent with lymphedema management- in progress  Long Term Goal 2: Pt will demonstrate a 2-3cm decrease in BLE girth measurements in order to reduce any new or worsening skin conditions - progressing  Long Term Goal 3: Pt will be fitted for custom compression in order to management lymphedema-progressing  Long Term Goal 4: Pt will be fitted for an at home pump in order t o reduce BLE girth measurements and reduce pain- progressing      Patient Goals : \"to decrease BLE swelling and reduce pain\"        Minutes Tracking:  Time In: 1427  Time Out: [address]  Minutes: 79       Eric Tang PTA, DPT   Date: 12/14/2022

## 2022-12-16 ENCOUNTER — HOSPITAL ENCOUNTER (OUTPATIENT)
Dept: PHYSICAL THERAPY | Age: 77
Setting detail: THERAPIES SERIES
Discharge: HOME | End: 2022-12-16
Payer: MEDICARE

## 2022-12-16 PROCEDURE — 97140 MANUAL THERAPY 1/> REGIONS: CPT

## 2022-12-16 PROCEDURE — 97110 THERAPEUTIC EXERCISES: CPT

## 2022-12-16 NOTE — PROGRESS NOTES
Phone: [address]          Fax: [phone]                      Outpatient Physical Therapy                                                             Lymphedema Treatment  Date: 2022  Patient: Silverio Whitlock  : 1945  [facility] #: [id number]  Referring Physician: Junito Orellana DO          Treatment Diagnosis: BLE lymphedema  Onset Date: 22  PT Insurance Information: Medicaid  Total # of Visits Approved: 19   Total # of Visits to Date: 23  No Show: 0  Canceled Appointment: 5     Subjective  Subjective: Pt reports 5-6/10 B LE pain today, stating it was 6-7/10 this morning with transfers. Pt states her R LE is tender to the touch today, and she can tell that her swelling is up today.        Lymph Assessment    Skin Integumentary:   Skin Texture: Dry  Edema Rebound: Quick  Stemmer Sign: Positive    Signs of Constriction (if applicable):   Papilloma (benign tumor arising from an epithelial layer): No  Fibrotic Areas: No  Lymphorrhea: No    Stage of Lymphedema: Stage 2: Spontaneously Irreversible Stage  Description:      Lymphedema Classification:   Type: Secondary Lymphedema  Left: Moderate     Right: Moderate        Exercises:  Exercise 1: HEP pt educated on keeping her legs elevated and compressed at 30mmHg, perform gentle exercises daily including ankle pumps, and reducing sodium and sugar intake    Manual:  Soft Tissue Mobilizaton: MLD to B LEs     RLE Complete Decongestion Therapy  Scale: Stage 2  Lymph Drainage Pattern: Lower extremity  Compression Technique: Vaso-pneumatic pump  Force (mmHg): 40 mmHg  Duration: 60 minutes  LLE Complete Decongestion Therapy  Scale: Stage 2  Lymph Drainage Pattern: Lower extremity  Compression Technique: Vaso-pneumatic pump  Force (mmHg): 40 mmHg  Duration : 60 minutes  Other Decongestion Therapy  Force (mmHg): 40 mmHg  Duration : 60 minutes  Other Decongestion Therapy  Force (mmHg): 40 mmHg  Duration : 60 minutes      Assessment  Assessment: MLD to B LE followed by pumps at 40 mmHg for 60 min. Pt with noted decrease in edema and softness post tx. Pt reported increased discomfort during pumps on R LE, had relief with pillows placed under R LE. Therapy Prognosis: Fair        Decision Making: Medium Complexity    Patient Education  Patient Education: HEP  Pt verbalized/demonstrated good understanding:     [x] Yes         [] No, pt required further clarification.          Goals  Short Term Goals  Time Frame for Short Term Goals: 3 weeks  Short Term Goal 1: Pt will be educated on her POC and HEP-met  Short Term Goal 2: Pt will initiate pump protocol-met    Long Term Goals  Time Frame for Long Term Goals : 6 weeks  Long Term Goal 1: Pt will be safe and independent with lymphedema management- in progress  Long Term Goal 2: Pt will demonstrate a 2-3cm decrease in BLE girth measurements in order to reduce any new or worsening skin conditions - progressing  Long Term Goal 3: Pt will be fitted for custom compression in order to management lymphedema-progressing  Long Term Goal 4: Pt will be fitted for an at home pump in order t o reduce BLE girth measurements and reduce pain- progressing      Patient Goals : \"to decrease BLE swelling and reduce pain\"        Minutes Tracking:  Time In: 1210  Time Out: [address]  Minutes: [address], PTA   Date: 12/16/2022

## 2022-12-21 ENCOUNTER — HOSPITAL ENCOUNTER (OUTPATIENT)
Dept: PHYSICAL THERAPY | Age: 77
Setting detail: THERAPIES SERIES
Discharge: HOME | End: 2022-12-21
Payer: MEDICARE

## 2022-12-21 PROCEDURE — 97140 MANUAL THERAPY 1/> REGIONS: CPT

## 2022-12-21 PROCEDURE — 97110 THERAPEUTIC EXERCISES: CPT

## 2022-12-21 NOTE — PROGRESS NOTES
Phone: [address]          Fax: [phone]                      Outpatient Physical Therapy                                                             Lymphedema Treatment  Date: 2022  Patient: Hellen Burgess  : 1945  [facility] #: [id number]  Referring Physician: Marleni Pandya DO          Treatment Diagnosis: BLE lymphedema  Onset Date: 22  PT Insurance Information: Medicaid  Total # of Visits Approved: 19   Total # of Visits to Date: 20  No Show: 0  Canceled Appointment: 5     Subjective  Subjective: Pt states she is doing good today, was able to transfer this morning into her wheelchair without pins & needles feeling in B feet.        Lymph Assessment  Skin Integumentary:   Skin Texture: Dry  Edema Rebound: Quick  Stemmer Sign: Positive    Signs of Constriction (if applicable):   Papilloma (benign tumor arising from an epithelial layer): No  Fibrotic Areas: No  Lymphorrhea: No    Stage of Lymphedema: Stage 2: Spontaneously Irreversible Stage  Description:      Lymphedema Classification:   Type: Secondary Lymphedema  Left: Moderate     Right: Moderate        Exercises:  Exercise 1: HEP pt educated on keeping her legs elevated and compressed at 30mmHg, perform gentle exercises daily including ankle pumps, and reducing sodium and sugar intake    Manual:  Soft Tissue Mobilizaton: MLD to B LEs     RLE Complete Decongestion Therapy  Scale: Stage 2  Lymph Drainage Pattern: Lower extremity  Compression Technique: Vaso-pneumatic pump  Force (mmHg): 35 mmHg  Duration: 60 minutes  LLE Complete Decongestion Therapy  Scale: Stage 2  Lymph Drainage Pattern: Lower extremity  Compression Technique: Vaso-pneumatic pump  Force (mmHg): 35 mmHg  Duration : 60 minutes  Other Decongestion Therapy  Force (mmHg): 35 mmHg  Duration : 60 minutes  Other Decongestion Therapy  Force (mmHg): 35 mmHg  Duration : 60 minutes      Assessment  Assessment: MLD to B LE for improved lymphatic flow. Started pt on pumps at 40 mmHg, after 10 min, pt requested to decrease pressure to 35 mmHg d/t increased B ankle pain today. Pt tolerated pumps at 35 mmHg for 50 min without increase in pain. Therapy Prognosis: Fair        Decision Making: Medium Complexity    Patient Education  Patient Education: HEP  Pt verbalized/demonstrated good understanding:     [x] Yes         [] No, pt required further clarification.          Goals  Short Term Goals  Time Frame for Short Term Goals: 3 weeks  Short Term Goal 2: Pt will initiate pump protocol-met    Long Term Goals  Time Frame for Long Term Goals : 6 weeks  Long Term Goal 1: Pt will be safe and independent with lymphedema management- in progress  Long Term Goal 2: Pt will demonstrate a 2-3cm decrease in BLE girth measurements in order to reduce any new or worsening skin conditions - progressing  Long Term Goal 3: Pt will be fitted for custom compression in order to management lymphedema-progressing  Long Term Goal 4: Pt will be fitted for an at home pump in order t o reduce BLE girth measurements and reduce pain- progressing      Patient Goals : \"to decrease BLE swelling and reduce pain\"        Minutes Tracking:  Time In: 0920  Time Out: 75 [facility]  Minutes: 80       [place], [place],   Date: 12/21/2022 no concerns

## 2022-12-28 ENCOUNTER — HOSPITAL ENCOUNTER (OUTPATIENT)
Dept: PHYSICAL THERAPY | Age: 77
Setting detail: THERAPIES SERIES
Discharge: HOME | End: 2022-12-28
Payer: MEDICARE

## 2022-12-28 PROCEDURE — 97110 THERAPEUTIC EXERCISES: CPT

## 2022-12-28 PROCEDURE — 97140 MANUAL THERAPY 1/> REGIONS: CPT

## 2022-12-28 NOTE — PROGRESS NOTES
Phone: [address]          Fax: [phone]                      Outpatient Physical Therapy                                                             Lymphedema Treatment  Date: 2022  Patient: Marylu Billy  : 1945  CSN #: [id number]  Referring Physician: Airam Ortez DO          Treatment Diagnosis: BLE lymphedema  Onset Date: 22  PT Insurance Information: Medicaid  Total # of Visits Approved: 19   Total # of Visits to Date: 21  No Show: 0  Canceled Appointment: 5     Subjective  Subjective: Pt reports minimal B feet pain, 2-3/10. Pt states she has been able to sleep better at night and the pain is continuing to stay down. Lymph Assessment    Skin Integumentary:   Skin Texture: Dry  Edema Rebound: Quick  Stemmer Sign: Positive    Signs of Constriction (if applicable):   Papilloma (benign tumor arising from an epithelial layer): No  Fibrotic Areas: No  Lymphorrhea: No    Stage of Lymphedema: Stage 2: Spontaneously Irreversible Stage  Description:      Lymphedema Classification:   Type: Secondary Lymphedema  Left: Moderate     Right: Moderate    Measurements: Area Measured: R LE, L LE (R LE: 3\" above 40.1 cm, 6\" above 52.5 cm.  L LE: 3\" above 45.5 cm, 6\" above 53.8 cm.)      Right Measurements Left Measurements   R LE Pre Girth Measurement (cm)  5th Tuberosity (cm): 26.8  Lateral malleolus: 30.3  Calf (cm): 57.7  Mid Knee (cm): 60.8  Thigh (cm): 76.5  Total Girth (cm): 252.1 L LE Pre Girth Measurement (cm)  5th Tuberosity (cm): 28.5  Lateral malleolus: 33.1  Calf (cm): 56.2  Mid Knee (cm): 59.7  Thigh (cm): 73.2  Total Girth (cm): 250.7           Exercises:  Exercise 1: HEP pt educated on keeping her legs elevated and compressed at 30mmHg, perform gentle exercises daily including ankle pumps, and reducing sodium and sugar intake    Manual:  Soft Tissue Mobilizaton: MLD to B LEs     RLE Complete Decongestion Therapy  Scale: Stage 2  Lymph Drainage Pattern: Lower extremity  Compression Technique: Vaso-pneumatic pump  Force (mmHg): 35 mmHg  Duration: 60 minutes  LLE Complete Decongestion Therapy  Scale: Stage 2  Lymph Drainage Pattern: Lower extremity  Compression Technique: Vaso-pneumatic pump  Force (mmHg): 35 mmHg  Duration : 60 minutes  Other Decongestion Therapy  Force (mmHg): 35 mmHg  Duration : 60 minutes  Other Decongestion Therapy  Force (mmHg): 35 mmHg  Duration : 60 minutes      Assessment  Assessment: MLD to B LE followed by pumps at 35 mmHg for 60 min. Pt with good tolerance to pumps this visit, measurements taken, good progress towards goals. Therapy Prognosis: Fair        Decision Making: Medium Complexity    Patient Education  Patient Education: Measurements  Pt verbalized/demonstrated good understanding:     [x] Yes         [] No, pt required further clarification.          Goals  Short Term Goals  Time Frame for Short Term Goals: 3 weeks  Short Term Goal 1: Pt will be educated on her POC and HEP-met  Short Term Goal 2: Pt will initiate pump protocol-met    Long Term Goals  Time Frame for Long Term Goals : 6 weeks  Long Term Goal 1: Pt will be safe and independent with lymphedema management- in progress  Long Term Goal 2: Pt will demonstrate a 2-3cm decrease in BLE girth measurements in order to reduce any new or worsening skin conditions - progressing  Long Term Goal 3: Pt will be fitted for custom compression in order to management lymphedema-progressing  Long Term Goal 4: Pt will be fitted for an at home pump in order t o reduce BLE girth measurements and reduce pain- progressing      Patient Goals : \"to decrease BLE swelling and reduce pain\"        Minutes Tracking:  Time In: 0917  Time Out: [address]  Minutes: [address], [place]   Date: 12/28/2022

## 2022-12-30 ENCOUNTER — HOSPITAL ENCOUNTER (OUTPATIENT)
Dept: PHYSICAL THERAPY | Age: 77
Setting detail: THERAPIES SERIES
Discharge: HOME | End: 2022-12-30
Payer: MEDICARE

## 2022-12-30 PROCEDURE — 97140 MANUAL THERAPY 1/> REGIONS: CPT

## 2022-12-30 PROCEDURE — 97110 THERAPEUTIC EXERCISES: CPT

## 2022-12-30 NOTE — PROGRESS NOTES
Phone: [address] [facility]          Fax: [phone]                      Outpatient Physical Therapy                                                             Lymphedema Treatment  Date: 2022  Patient: Julia Barboza  : 1945  [facility] #: [id number]  Referring Physician: Adriana Hartman DO          Treatment Diagnosis: BLE lymphedema  Onset Date: 22  PT Insurance Information: Medicaid  Total # of Visits Approved: 19   Total # of Visits to Date:   No Show: 0  Canceled Appointment: 5     Subjective  Subjective: Pt reports increased B feet pain today. Pt states she is doing ok today other than the increased pain. Lymph Assessment  Skin Integumentary:   Skin Texture: Dry  Edema Rebound: Quick  Stemmer Sign: Positive    Signs of Constriction (if applicable):   Papilloma (benign tumor arising from an epithelial layer): No  Fibrotic Areas: No  Lymphorrhea: No    Stage of Lymphedema: Stage 2: Spontaneously Irreversible Stage  Description:      Lymphedema Classification:   Type: Secondary Lymphedema  Left: Moderate     Right: Moderate      Exercises:  Exercise 1: HEP pt educated on keeping her legs elevated and compressed at 30mmHg, perform gentle exercises daily including ankle pumps, and reducing sodium and sugar intake    Manual:  Soft Tissue Mobilizaton: MLD to B LEs     RLE Complete Decongestion Therapy  Scale: Stage 2  Lymph Drainage Pattern: Lower extremity  Compression Technique: Vaso-pneumatic pump  Force (mmHg): 35 mmHg  Duration: 60 minutes  LLE Complete Decongestion Therapy  Scale: Stage 2  Lymph Drainage Pattern: Lower extremity  Compression Technique: Vaso-pneumatic pump  Force (mmHg): 35 mmHg  Duration : 60 minutes  Other Decongestion Therapy  Force (mmHg): 35 mmHg  Duration : 60 minutes  Other Decongestion Therapy  Force (mmHg): 35 mmHg  Duration : 60 minutes      Assessment  Assessment: MLD to B LE followed by pumps at 35 mmHg for 60 min.  Pt seen by Olga Marinelli DPT from 15:40-14:48 PM for updated POC. R LE midcalf 57 cm, L LE midcalf 56.5 cm. Pt making good progress towards goals, awaiting on approval for pumps for facility. Pt will continue to benefit from lymphedema treatment. Therapy Prognosis: Fair        Decision Making: Medium Complexity    Patient Education  Patient Education: Measurements  Pt verbalized/demonstrated good understanding:     [x] Yes         [] No, pt required further clarification.          Goals  Short Term Goals  Time Frame for Short Term Goals: 3 weeks  Short Term Goal 1: Pt will be educated on her POC and HEP-met  Short Term Goal 2: Pt will initiate pump protocol-met    Long Term Goals  Time Frame for Long Term Goals : 6 weeks  Long Term Goal 1: Pt will be safe and independent with lymphedema management- in progress  Long Term Goal 2: Pt will demonstrate a 2-3cm decrease in BLE girth measurements in order to reduce any new or worsening skin conditions - progressing  Long Term Goal 3: Pt will be fitted for custom compression in order to management lymphedema-progressing  Long Term Goal 4: Pt will be fitted for an at home pump in order t o reduce BLE girth measurements and reduce pain- progressing      Patient Goals : \"to decrease BLE swelling and reduce pain\"        Minutes Tracking:  Time In: 1406  Time Out: 1 [facility]   Minutes: [de-identified]       Ted Nelson [place]   Date: 12/30/2022

## 2023-01-04 ENCOUNTER — HOSPITAL ENCOUNTER (OUTPATIENT)
Dept: PHYSICAL THERAPY | Age: 78
Setting detail: THERAPIES SERIES
Discharge: HOME | End: 2023-01-04
Payer: MEDICARE

## 2023-01-04 PROCEDURE — 97110 THERAPEUTIC EXERCISES: CPT

## 2023-01-04 PROCEDURE — 97140 MANUAL THERAPY 1/> REGIONS: CPT

## 2023-01-04 NOTE — PLAN OF CARE
[facility]           Phone: [phone]             Outpatient Physical Therapy  Fax: [phone]                                           Date: 2022  Patient: Oumar Nelson : 1945 CSN #: [id number]   Referring Physician: Rosio Chavez DO      [] Plan of Care   [x] Updated Plan of Care    Dates of Service to Include: 2022 to 23    Diagnosis:       Rehab (Treatment) Diagnosis:  BLE lymphedema             Onset Date:  22    Attendance  Total # of Visits to Date:  No Show: 0 Canceled Appointment: 5    Assessment  Assessment: MLD to B LE followed by pumps at 35 mmHg for 60 min. Pt seen by Ever Inman DPT from 15:40-14:48 PM for updated POC. R LE midcalf 57 cm, L LE midcalf 56.5 cm. Pt making good progress towards goals, awaiting on approval for pumps for facility. Pt will continue to benefit from lymphedema treatment.       Goals  Short Term Goals  Time Frame for Short Term Goals: 3 weeks  Short Term Goal 1: Pt will be educated on her POC and HEP-met  Short Term Goal 2: Pt will initiate pump protocol-met  Long Term Goals  Time Frame for Long Term Goals : 6 weeks  Long Term Goal 1: Pt will be safe and independent with lymphedema management- in progress  Long Term Goal 2: Pt will demonstrate a 2-3cm decrease in BLE girth measurements in order to reduce any new or worsening skin conditions - progressing  Long Term Goal 3: Pt will be fitted for custom compression in order to management lymphedema-progressing  Long Term Goal 4: Pt will be fitted for an at home pump in order t o reduce BLE girth measurements and reduce pain- progressing     Prognosis  Therapy Prognosis: Fair    Treatment Plan   Plan Frequency: 2x/wk  Plan weeks: 4-6 weeks  [] HP/CP      [] Electrical Stim   [x] Therapeutic Exercise      [] Gait Training  [] Aquatics   [] Ultrasound         [x] Patient Education/HEP   [x] Manual Therapy  [] Traction    [] Neuro-gildardo        [x] Soft Tissue Mobs            [] Home TENS  [] Iontophoresis    [x] Lymphedema       [] Dry Needling  [] Blood Flow Restriction             Electronically signed by: Patito Real PT, DPT    Date: 12/30/2022      ______________________________________ Date: 12/30/2022   Physician Signature

## 2023-01-04 NOTE — PROGRESS NOTES
Phone: [phone]                       [facility]          Fax: [phone]                      Outpatient Physical Therapy                                                             Lymphedema Treatement  Date: 2023  Patient: Gage Herrera  : 1945  [facility] #: [id number]  Referring Physician: Dilip Price DO          Treatment Diagnosis: BLE lymphedema  Onset Date: 22  PT Insurance Information: Medicaid  Total # of Visits Approved: 19   Total # of Visits to Date: 23  No Show: 0  Canceled Appointment: 5     Subjective  Subjective: Pt reports B feet pain today, R > L, 4-5/10. Pt states she has been elevating her B LE but they feel bigger today. Lymph Assessment  Skin Integumentary:   Skin Texture: Dry  Edema Rebound: Quick  Stemmer Sign: Positive    Signs of Constriction (if applicable):   Papilloma (benign tumor arising from an epithelial layer): No  Fibrotic Areas: No  Lymphorrhea: No    Stage of Lymphedema:    Description:      Lymphedema Classification:   Type: Secondary Lymphedema  Left: Moderate     Right: Moderate          Exercises:  Exercise 1: HEP pt educated on keeping her legs elevated and compressed at 30mmHg, perform gentle exercises daily including ankle pumps, and reducing sodium and sugar intake    Manual:  Soft Tissue Mobilizaton: MLD to B LEs     RLE Complete Decongestion Therapy  Scale: Stage 2  Lymph Drainage Pattern: Lower extremity  Compression Technique: Vaso-pneumatic pump  Force (mmHg): 35 mmHg  Duration: 60 minutes  LLE Complete Decongestion Therapy  Scale: Stage 2  Lymph Drainage Pattern: Lower extremity  Compression Technique: Vaso-pneumatic pump  Force (mmHg): 35 mmHg  Duration : 60 minutes  Other Decongestion Therapy  Force (mmHg): 35 mmHg  Duration : 60 minutes  Other Decongestion Therapy  Force (mmHg): 35 mmHg  Duration : 60 minutes      Assessment  Assessment: MLD to B LE followed by pumps at 35 mmHg for 60 min.  Pt with increased edema and tightness this visit. Pt with good tolerance to pumps and good skin movement post tx.  Therapy Prognosis: Fair        Decision Making: Medium Complexity    Patient Education  Patient Education: Elevation, HEP  Pt verbalized/demonstrated good understanding:     [x] Yes         [] No, pt required further clarification.         Goals  Short Term Goals  Time Frame for Short Term Goals: 3 weeks  Short Term Goal 1: Pt will be educated on her POC and HEP-met  Short Term Goal 2: Pt will initiate pump protocol-met    Long Term Goals  Time Frame for Long Term Goals : 6 weeks  Long Term Goal 1: Pt will be safe and independent with lymphedema management- in progress  Long Term Goal 2: Pt will demonstrate a 2-3cm decrease in BLE girth measurements in order to reduce any new or worsening skin conditions - progressing  Long Term Goal 3: Pt will be fitted for custom compression in order to management lymphedema-progressing  Long Term Goal 4: Pt will be fitted for an at home pump in order t o reduce BLE girth measurements and reduce pain- progressing      Patient Goals : \"to decrease BLE swelling and reduce pain\"        Minutes Tracking:  Time In: 0922  Time Out: 1044  Minutes: 82       Sofia Pringle, PTA   Date: 1/4/2023

## 2023-01-06 ENCOUNTER — HOSPITAL ENCOUNTER (OUTPATIENT)
Dept: PHYSICAL THERAPY | Age: 78
Setting detail: THERAPIES SERIES
Discharge: HOME | End: 2023-01-06
Payer: MEDICARE

## 2023-01-06 PROCEDURE — 97110 THERAPEUTIC EXERCISES: CPT

## 2023-01-06 PROCEDURE — 97140 MANUAL THERAPY 1/> REGIONS: CPT

## 2023-01-11 ENCOUNTER — HOSPITAL ENCOUNTER (OUTPATIENT)
Dept: PHYSICAL THERAPY | Age: 78
Setting detail: THERAPIES SERIES
Discharge: HOME | End: 2023-01-11
Payer: MEDICARE

## 2023-01-11 PROCEDURE — 97140 MANUAL THERAPY 1/> REGIONS: CPT

## 2023-01-11 PROCEDURE — 97110 THERAPEUTIC EXERCISES: CPT

## 2023-01-11 NOTE — PROGRESS NOTES
[address]          Fax: [phone]                      Outpatient Physical Therapy                                                             Lymphedema Treatment  Date: 2023  Patient: Martin Ochoa  : 1945  [facility] #: [id number]  Referring Physician: Reji Blunt DO          Treatment Diagnosis: BLE lymphedema  Onset Date: 22  PT Insurance Information: Medicaid  Total # of Visits Approved: 36   Total # of Visits to Date: 24  No Show: 0  Canceled Appointment: 5     Subjective  Subjective: Pt reports feeling ok today, reports swelling in B LE is up some today, has ace wraps on B LE. Pt reports B feet pain /10.        Lymph Assessment  Skin Integumentary:   Skin Texture: Dry  Edema Rebound: Quick  Stemmer Sign: Positive    Signs of Constriction (if applicable):   Papilloma (benign tumor arising from an epithelial layer): No  Fibrotic Areas: No  Lymphorrhea: No    Stage of Lymphedema: Stage 2: Spontaneously Irreversible Stage  Description:      Lymphedema Classification:   Type: Secondary Lymphedema  Left: Moderate     Right: Moderate        Exercises:  Exercise 1: HEP pt educated on keeping her legs elevated and compressed at 30mmHg, perform gentle exercises daily including ankle pumps, and reducing sodium and sugar intake    Manual:  Soft Tissue Mobilizaton: MLD to B LEs  Other: Wrapping to B LE     RLE Complete Decongestion Therapy  Scale: Stage 2  Lymph Drainage Pattern: Lower extremity  Compression Technique: Vaso-pneumatic pump  Force (mmHg): 35 mmHg  Duration: 60 minutes  LLE Complete Decongestion Therapy  Scale: Stage 2  Lymph Drainage Pattern: Lower extremity  Compression Technique: Vaso-pneumatic pump  Force (mmHg): 35 mmHg  Duration : 60 minutes  Other Decongestion Therapy  Force (mmHg): 35 mmHg  Duration : 60 minutes  Other Decongestion Therapy  Force (mmHg): 35 mmHg  Duration : 60 minutes      Assessment  Assessment: MLD to B LE followed by pumps at 35 mmHg for 60 min. PTA wrapped B LE with ace wraps at approximatly 30 mmHg. Pt with noted increase in softness post tx. Therapy Prognosis: Fair        Decision Making: Medium Complexity    Patient Education  Patient Education: Wrapping  Pt verbalized/demonstrated good understanding:     [x] Yes         [] No, pt required further clarification.          Goals  Short Term Goals  Time Frame for Short Term Goals: 3 weeks  Short Term Goal 1: Pt will be educated on her POC and HEP-met  Short Term Goal 2: Pt will initiate pump protocol-met    Long Term Goals  Time Frame for Long Term Goals : 6 weeks  Long Term Goal 1: Pt will be safe and independent with lymphedema management- in progress  Long Term Goal 2: Pt will demonstrate a 2-3cm decrease in BLE girth measurements in order to reduce any new or worsening skin conditions - progressing  Long Term Goal 3: Pt will be fitted for custom compression in order to management lymphedema-progressing  Long Term Goal 4: Pt will be fitted for an at home pump in order t o reduce BLE girth measurements and reduce pain- progressing      Patient Goals : \"to decrease BLE swelling and reduce pain\"        Minutes Tracking:  Time In: 1418  Time Out: [address]  Minutes: [address], PTA   Date: 1/11/2023

## 2023-01-13 ENCOUNTER — HOSPITAL ENCOUNTER (OUTPATIENT)
Dept: PHYSICAL THERAPY | Age: 78
Setting detail: THERAPIES SERIES
Discharge: HOME | End: 2023-01-13
Payer: MEDICARE

## 2023-01-13 PROCEDURE — 97110 THERAPEUTIC EXERCISES: CPT

## 2023-01-13 PROCEDURE — 97140 MANUAL THERAPY 1/> REGIONS: CPT

## 2023-01-13 NOTE — PROGRESS NOTES
Phone: [address]          Fax: [phone]                      Outpatient Physical Therapy                                                             Lymphedema Treatment  Date: 2023  Patient: Nasreen Guillory  : 1945  [facility] #: [id number]  Referring Physician: Juan Fernandes DO          Treatment Diagnosis: BLE lymphedema  Onset Date: 22  PT Insurance Information: Medicaid  Total # of Visits Approved: 36   Total # of Visits to Date: 25  No Show: 0  Canceled Appointment: 5     Subjective  Subjective: Pt states she is doing ok today, has her \"normal\" pain in B feet.        Lymph Assessment    Skin Integumentary:   Skin Color: Ecchymosis (comment)  Skin Texture: Dry  Turgor: Shiney/hard  Edema Rebound: Quick  Stemmer Sign: Positive    Signs of Constriction (if applicable):   Papilloma (benign tumor arising from an epithelial layer): No  Fibrotic Areas: No  Lymphorrhea: No    Stage of Lymphedema: Stage 2: Spontaneously Irreversible Stage  Description:      Lymphedema Classification:   Type: Secondary Lymphedema  Left: Moderate     Right: Moderate    Exercises:  Exercise 1: HEP pt educated on keeping her legs elevated and compressed at 30mmHg, perform gentle exercises daily including ankle pumps, and reducing sodium and sugar intake    Manual:  Soft Tissue Mobilizaton: MLD to B LEs  Other: Wrapping to B LE     Skin Integumentary  Skin Color: Ecchymosis (comment)  Turgor: Shiney/hard  RLE Complete Decongestion Therapy  Scale: Stage 2  Lymph Drainage Pattern: Lower extremity  Compression Technique: Vaso-pneumatic pump  Force (mmHg): 35 mmHg  Duration: 60 minutes  LLE Complete Decongestion Therapy  Scale: Stage 2  Lymph Drainage Pattern: Lower extremity  Compression Technique: Vaso-pneumatic pump  Force (mmHg): 35 mmHg  Duration : 60 minutes  Other Decongestion Therapy  Force (mmHg): 35 mmHg  Duration : 60 minutes  Skin Integumentary  Skin Color: Ecchymosis (comment)  Turgor: Shiney/hard  Other Decongestion Therapy  Force (mmHg): 35 mmHg  Duration : 60 minutes      Assessment  Assessment: MLD to B LE followed by pumps at 35 mmHg for 60 min. Pt with non-pitting edema this visit and decreased edema on top of B feet. Therapy Prognosis: Fair        Decision Making: Medium Complexity    Patient Education  Patient Education: HEP  Pt verbalized/demonstrated good understanding:     [x] Yes         [] No, pt required further clarification.          Goals  Short Term Goals  Time Frame for Short Term Goals: 3 weeks  Short Term Goal 1: Pt will be educated on her POC and HEP-met  Short Term Goal 2: Pt will initiate pump protocol-met    Long Term Goals  Time Frame for Long Term Goals : 6 weeks  Long Term Goal 1: Pt will be safe and independent with lymphedema management- in progress  Long Term Goal 2: Pt will demonstrate a 2-3cm decrease in BLE girth measurements in order to reduce any new or worsening skin conditions - progressing  Long Term Goal 3: Pt will be fitted for custom compression in order to management lymphedema-progressing  Long Term Goal 4: Pt will be fitted for an at home pump in order t o reduce BLE girth measurements and reduce pain- progressing      Patient Goals : \"to decrease BLE swelling and reduce pain\"        Minutes Tracking:  Time In: 0521  Time Out: 1018  Minutes: 80       Devin Vickers   Date: 1/13/2023

## 2023-01-18 ENCOUNTER — HOSPITAL ENCOUNTER (OUTPATIENT)
Dept: PHYSICAL THERAPY | Age: 78
Setting detail: THERAPIES SERIES
Discharge: HOME | End: 2023-01-18
Payer: MEDICARE

## 2023-01-18 PROCEDURE — 97140 MANUAL THERAPY 1/> REGIONS: CPT

## 2023-01-18 PROCEDURE — 97110 THERAPEUTIC EXERCISES: CPT

## 2023-01-18 NOTE — PROGRESS NOTES
Phone: [address]          Fax: [phone]                      Outpatient Physical Therapy                                                             Lymphedema Treatment  Date: 2023  Patient: Marlin Richmond  : 1945  [facility] #: [id number]  Referring Physician: Hellen Campos DO          Treatment Diagnosis: BLE lymphedema  Onset Date: 22  PT Insurance Information: Medicaid  Total # of Visits Approved: 36   Total # of Visits to Date: 26  No Show: 0  Canceled Appointment: 5     Subjective  Subjective: Pt reports increased B feet pain today, 5-6/10, stating \"it was hard to do my transfers this morning into my chair\". Pt states the wraps are feeling good on her legs.        Lymph Assessment    Skin Integumentary:   Skin Texture: Dry  Turgor: Shiney/hard  Edema Rebound: Quick  Stemmer Sign: Positive    Signs of Constriction (if applicable):   Papilloma (benign tumor arising from an epithelial layer): No  Fibrotic Areas: No  Lymphorrhea: No    Stage of Lymphedema: Stage 2: Spontaneously Irreversible Stage  Description:      Lymphedema Classification:   Type: Secondary Lymphedema  Left: Moderate     Right: Moderate        Exercises:  Exercise 1: HEP pt educated on keeping her legs elevated and compressed at 30mmHg, perform gentle exercises daily including ankle pumps, and reducing sodium and sugar intake    Manual:  Soft Tissue Mobilizaton: MLD to B LEs  Other: Wrapping to B LE     Skin Integumentary  Turgor: Shiney/hard  RLE Complete Decongestion Therapy  Scale: Stage 2  Lymph Drainage Pattern: Lower extremity  Compression Technique: Vaso-pneumatic pump  Force (mmHg): 35 mmHg  Duration: 60 minutes  LLE Complete Decongestion Therapy  Scale: Stage 2  Lymph Drainage Pattern: Lower extremity  Compression Technique: Vaso-pneumatic pump  Force (mmHg): 35 mmHg  Duration : 60 minutes  Other Decongestion Therapy  Force (mmHg): 35 mmHg  Duration : 60 minutes  Skin Integumentary  Turgor: Shiney/hard  Other Decongestion Therapy  Force (mmHg): 35 mmHg  Duration : 60 minutes      Assessment  Assessment: MLD to B LE followed by pumps at 35 mmHg for 60 min. Pt with non-pitting edema this visit and decreased edema on top of B feet. Pt with good skin movement post tx. Therapy Prognosis: Fair        Decision Making: Medium Complexity    Patient Education  Patient Education: HEP  Pt verbalized/demonstrated good understanding:     [x] Yes         [] No, pt required further clarification.          Goals  Short Term Goals  Time Frame for Short Term Goals: 3 weeks  Short Term Goal 1: Pt will be educated on her POC and HEP-met  Short Term Goal 2: Pt will initiate pump protocol-met    Long Term Goals  Time Frame for Long Term Goals : 6 weeks  Long Term Goal 1: Pt will be safe and independent with lymphedema management- in progress  Long Term Goal 2: Pt will demonstrate a 2-3cm decrease in BLE girth measurements in order to reduce any new or worsening skin conditions - progressing  Long Term Goal 3: Pt will be fitted for custom compression in order to management lymphedema-progressing  Long Term Goal 4: Pt will be fitted for an at home pump in order t o reduce BLE girth measurements and reduce pain- progressing      Patient Goals : \"to decrease BLE swelling and reduce pain\"        Minutes Tracking:  Time In: 1347  Time Out: Ori 89  Minutes: 80       Devin Plaza   Date: 1/18/2023

## 2023-01-20 ENCOUNTER — HOSPITAL ENCOUNTER (OUTPATIENT)
Dept: PHYSICAL THERAPY | Age: 78
Setting detail: THERAPIES SERIES
Discharge: HOME | End: 2023-01-20
Payer: MEDICARE

## 2023-01-20 PROCEDURE — 97140 MANUAL THERAPY 1/> REGIONS: CPT

## 2023-01-20 PROCEDURE — 97110 THERAPEUTIC EXERCISES: CPT

## 2023-01-20 NOTE — PROGRESS NOTES
Phone: [phone]                       [facility]          Fax: [phone]                      Outpatient Physical Therapy                                                             Lymphedema Treatment  Date: 2023  Patient: Karthik Draper  : 1945  CSN #: [id number]  Referring Physician: Damien Raya DO          Treatment Diagnosis: BLE lymphedema  Onset Date: 22  PT Insurance Information: Medicaid  Total # of Visits Approved: 36   Total # of Visits to Date: 27  No Show: 0  Canceled Appointment: 5     Subjective  Subjective: Pt reports \"normal\" feet pain today. Pt states she is doing ok today, hoping she gets pumps soon. Lymph Assessment  Skin Integumentary:   Skin Texture: Dry  Edema Rebound: Quick  Stemmer Sign: Positive    Signs of Constriction (if applicable):   Papilloma (benign tumor arising from an epithelial layer): No  Fibrotic Areas: No  Lymphorrhea: No    Stage of Lymphedema: Stage 2: Spontaneously Irreversible Stage  Description:      Lymphedema Classification:   Type: Secondary Lymphedema  Left: Moderate     Right: Moderate      Exercises:  Exercise 1: HEP pt educated on keeping her legs elevated and compressed at 30mmHg, perform gentle exercises daily including ankle pumps, and reducing sodium and sugar intake    Manual:  Soft Tissue Mobilizaton: MLD to B LEs  Other: Wrapping to B LE     RLE Complete Decongestion Therapy  Scale: Stage 2  Lymph Drainage Pattern: Lower extremity  Compression Technique: Vaso-pneumatic pump  Force (mmHg): 35 mmHg  Duration: 60 minutes         Assessment  Assessment: MLD to B LE followed by pumps at 35 mmHg for 60 min. Wrapping to B LE to approximately 30 mmHg. Pt with decrease in edema post tx. Therapy Prognosis: Fair        Decision Making: Medium Complexity    Patient Education  Patient Education: Wrapping  Pt verbalized/demonstrated good understanding:     [x] Yes         [] No, pt required further clarification. Goals  Short Term Goals  Time Frame for Short Term Goals: 3 weeks  Short Term Goal 1: Pt will be educated on her POC and HEP-met  Short Term Goal 2: Pt will initiate pump protocol-met    Long Term Goals  Time Frame for Long Term Goals : 6 weeks  Long Term Goal 1: Pt will be safe and independent with lymphedema management- in progress  Long Term Goal 2: Pt will demonstrate a 2-3cm decrease in BLE girth measurements in order to reduce any new or worsening skin conditions - progressing  Long Term Goal 3: Pt will be fitted for custom compression in order to management lymphedema-progressing  Long Term Goal 4: Pt will be fitted for an at home pump in order t o reduce BLE girth measurements and reduce pain- progressing      Patient Goals : \"to decrease BLE swelling and reduce pain\"        Minutes Tracking:  Time In: 1406  Time Out: [address] [facility] [facility]  Minutes: 80       Shubham Meracdo PTA   Date: 1/20/2023

## 2023-01-25 ENCOUNTER — HOSPITAL ENCOUNTER (OUTPATIENT)
Dept: PHYSICAL THERAPY | Age: 78
Setting detail: THERAPIES SERIES
Discharge: HOME | End: 2023-01-25
Payer: MEDICARE

## 2023-01-25 PROCEDURE — 97140 MANUAL THERAPY 1/> REGIONS: CPT

## 2023-01-25 PROCEDURE — 97110 THERAPEUTIC EXERCISES: CPT

## 2023-01-25 NOTE — PROGRESS NOTES
Phone: [phone]                       [facility]          Fax: [phone]                      Outpatient Physical Therapy                                                             Lymphedema Treatment  Date: 2023  Patient: Irina Handy  : 1945  CSN #: [id number]  Referring Physician: Nat Templeton DO          Treatment Diagnosis: BLE lymphedema  Onset Date: 22  PT Insurance Information: Medicaid  Total # of Visits Approved: 36   Total # of Visits to Date: 28  No Show: 0  Canceled Appointment: 5     Subjective  Subjective: Pt reports increased pins and needles in B feet today, stating her transfers were painful this morning.       Lymph Assessment    Skin Integumentary:   Skin Texture: Dry  Turgor: Shiney/hard  Edema Rebound: Quick  Stemmer Sign: Positive    Signs of Constriction (if applicable):   Papilloma (benign tumor arising from an epithelial layer): No  Fibrotic Areas: No  Lymphorrhea: No    Stage of Lymphedema: Stage 2: Spontaneously Irreversible Stage  Description:      Lymphedema Classification:   Type: Secondary Lymphedema  Left: Moderate     Right: Moderate      Exercises:  Exercise 1: HEP pt educated on keeping her legs elevated and compressed at 30mmHg, perform gentle exercises daily including ankle pumps, and reducing sodium and sugar intake    Manual:  Soft Tissue Mobilizaton: MLD to B LEs  Other: Wrapping to B LE     Skin Integumentary  Turgor: Shiney/hard  RLE Complete Decongestion Therapy  Scale: Stage 2  Lymph Drainage Pattern: Lower extremity  Compression Technique: Vaso-pneumatic pump  Force (mmHg): 35 mmHg  Duration: 60 minutes  LLE Complete Decongestion Therapy  Scale: Stage 2  Lymph Drainage Pattern: Lower extremity  Compression Technique: Vaso-pneumatic pump  Force (mmHg): 35 mmHg  Duration : 60 minutes  Other Decongestion Therapy  Force (mmHg): 35 mmHg  Duration : 60 minutes  Skin Integumentary  Turgor: Shiney/hard  Other Decongestion Therapy  Force  (mmHg): 35 mmHg  Duration : 60 minutes      Assessment  Assessment: MLD to B LE followed by pumps at 35 mmHg for 60 min. Wrapping to B LE to approximately 30 mmHg. Pt with decreased pins and needles feeling and pain post tx. Therapy Prognosis: Fair        Decision Making: Medium Complexity    Patient Education  Patient Education: HEP  Pt verbalized/demonstrated good understanding:     [x] Yes         [] No, pt required further clarification.          Goals  Short Term Goals  Time Frame for Short Term Goals: 3 weeks  Short Term Goal 1: Pt will be educated on her POC and HEP-met  Short Term Goal 2: Pt will initiate pump protocol-met    Long Term Goals  Time Frame for Long Term Goals : 6 weeks  Long Term Goal 1: Pt will be safe and independent with lymphedema management- in progress  Long Term Goal 2: Pt will demonstrate a 2-3cm decrease in BLE girth measurements in order to reduce any new or worsening skin conditions - progressing  Long Term Goal 3: Pt will be fitted for custom compression in order to management lymphedema-progressing  Long Term Goal 4: Pt will be fitted for an at home pump in order t o reduce BLE girth measurements and reduce pain- progressing      Patient Goals : \"to decrease BLE swelling and reduce pain\"        Minutes Tracking:  Time In: 0920  Time Out: [address]  Minutes: 80       Ashley Velez, JAMA   Date: 1/25/2023

## 2023-01-27 ENCOUNTER — HOSPITAL ENCOUNTER (OUTPATIENT)
Dept: PHYSICAL THERAPY | Age: 78
Setting detail: THERAPIES SERIES
Discharge: HOME | End: 2023-01-27
Payer: MEDICARE

## 2023-01-27 PROCEDURE — 97140 MANUAL THERAPY 1/> REGIONS: CPT

## 2023-01-27 PROCEDURE — 97110 THERAPEUTIC EXERCISES: CPT

## 2023-01-27 NOTE — PROGRESS NOTES
Phone: [phone]                       [facility]          Fax: [phone]                      Outpatient Physical Therapy                                                             Lymphedema Treatment  Date: 2023  Patient: Irina Handy  : 1945  CSN #: [id number]  Referring Physician: Nat Templeton DO          Treatment Diagnosis: BLE lymphedema  Onset Date: 22  PT Insurance Information: Medicaid  Total # of Visits Approved: 36   Total # of Visits to Date: 29  No Show: 0  Canceled Appointment: 5     Subjective  Subjective: Pt reports feeling better today than last visit, decreased pain.       Lymph Assessment    Skin Integumentary:   Skin Color: Ecchymosis (comment)  Skin Texture: Dry  Turgor: Shiney/hard  Edema Rebound: Quick  Stemmer Sign: Positive    Signs of Constriction (if applicable):   Papilloma (benign tumor arising from an epithelial layer): No  Fibrotic Areas: No  Lymphorrhea: No    Stage of Lymphedema: Stage 2: Spontaneously Irreversible Stage  Description:      Lymphedema Classification:   Type: Secondary Lymphedema  Left: Moderate     Right: Moderate        Exercises:  Exercise 1: HEP pt educated on keeping her legs elevated and compressed at 30mmHg, perform gentle exercises daily including ankle pumps, and reducing sodium and sugar intake    Manual:  Soft Tissue Mobilizaton: MLD to B LEs  Other: Wrapping to B LE     Skin Integumentary  Skin Color: Ecchymosis (comment)  Turgor: Shiney/hard  RLE Complete Decongestion Therapy  Scale: Stage 2  Lymph Drainage Pattern: Lower extremity  Compression Technique: Vaso-pneumatic pump  Force (mmHg): 35 mmHg  Duration: 60 minutes  LLE Complete Decongestion Therapy  Scale: Stage 2  Lymph Drainage Pattern: Lower extremity  Compression Technique: Vaso-pneumatic pump  Force (mmHg): 35 mmHg  Duration : 60 minutes  Other Decongestion Therapy  Force (mmHg): 35 mmHg  Duration : 60 minutes  Skin Integumentary  Skin Color: Ecchymosis  (comment)  Turgor: Shiney/hard  Other Decongestion Therapy  Force (mmHg): 35 mmHg  Duration : 60 minutes      Assessment  Assessment: MLD to B LE followed by pumps at 35 mmHg for 60 min. Wrapping to B LE to approximately 30 mmHg. Pt with decreased B feet edema post tx. Therapy Prognosis: Fair        Decision Making: Medium Complexity    Patient Education  Patient Education: HEP  Pt verbalized/demonstrated good understanding:     [x] Yes         [] No, pt required further clarification.          Goals  Short Term Goals  Time Frame for Short Term Goals: 3 weeks  Short Term Goal 1: Pt will be educated on her POC and HEP-met  Short Term Goal 2: Pt will initiate pump protocol-met    Long Term Goals  Time Frame for Long Term Goals : 6 weeks  Long Term Goal 1: Pt will be safe and independent with lymphedema management- in progress  Long Term Goal 2: Pt will demonstrate a 2-3cm decrease in BLE girth measurements in order to reduce any new or worsening skin conditions - progressing  Long Term Goal 3: Pt will be fitted for custom compression in order to management lymphedema-progressing  Long Term Goal 4: Pt will be fitted for an at home pump in order t o reduce BLE girth measurements and reduce pain- progressing      Patient Goals : \"to decrease BLE swelling and reduce pain\"        Minutes Tracking:  Time In: 1217  Time Out: [address]  Minutes: [address], [place]   Date: 1/27/2023

## 2023-02-01 ENCOUNTER — HOSPITAL ENCOUNTER (OUTPATIENT)
Dept: PHYSICAL THERAPY | Age: 78
Setting detail: THERAPIES SERIES
Discharge: HOME | End: 2023-02-01
Payer: MEDICARE

## 2023-02-01 PROCEDURE — 97140 MANUAL THERAPY 1/> REGIONS: CPT

## 2023-02-01 PROCEDURE — 97110 THERAPEUTIC EXERCISES: CPT

## 2023-02-01 NOTE — PROGRESS NOTES
Phone: [phone] [facility]          Fax: [phone]                      Outpatient Physical Therapy                                                             Lymphedema Treatment  Date: 2023  Patient: Scott Lopez  : 1945  [facility] #: [id number]  Referring Physician: Artur Garcia DO          Treatment Diagnosis: BLE lymphedema  Onset Date: 22  PT Insurance Information: Medicaid  Total # of Visits Approved: 36   Total # of Visits to Date: 30  No Show: 0  Canceled Appointment: 5     Subjective  Subjective: Pt reports increasd swelling and pain over the weekend, 5-6/10 B LE. Lymph Assessment  Skin Integumentary:   Skin Color: Ecchymosis (comment)  Skin Texture: Dry  Edema Rebound: Quick  Stemmer Sign: Positive    Signs of Constriction (if applicable):   Papilloma (benign tumor arising from an epithelial layer): No  Fibrotic Areas: No  Lymphorrhea: No    Stage of Lymphedema: Stage 2: Spontaneously Irreversible Stage  Description:      Lymphedema Classification:   Type: Secondary Lymphedema  Left: Moderate     Right: Moderate    Measurements: Area Measured: R LE, L LE (R LE: 3\" above 40.7 cm, 6\" above 53.8 cm.  L LE: 3\" above 41 cm, 6\" above 53.8 cm.)      Right Measurements Left Measurements   R LE Pre Girth Measurement (cm)  5th Tuberosity (cm): 25.5  Lateral malleolus: 29.5  Calf (cm): 61.8  Mid Knee (cm): 62.4  Thigh (cm): 81.5  Total Girth (cm): 260.7 L LE Pre Girth Measurement (cm)  5th Tuberosity (cm): 27  Lateral malleolus: 30.5  Calf (cm): 61  Mid Knee (cm): 60  Thigh (cm): 78.4  Total Girth (cm): 256.9         Exercises:  Exercise 1: HEP pt educated on keeping her legs elevated and compressed at 30mmHg, perform gentle exercises daily including ankle pumps, and reducing sodium and sugar intake    Manual:  Soft Tissue Mobilizaton: MLD to B LEs  Other: Wrapping to B LE     Skin Integumentary  Skin Color: Ecchymosis (comment)  RLE Complete Decongestion Therapy  Scale: Stage 2  Lymph Drainage Pattern: Lower extremity  Compression Technique: Vaso-pneumatic pump  Force (mmHg): 35 mmHg  Duration: 45 minutes  LLE Complete Decongestion Therapy  Scale: Stage 2  Lymph Drainage Pattern: Lower extremity  Compression Technique: Vaso-pneumatic pump  Force (mmHg): 35 mmHg  Duration : 45 minutes  Other Decongestion Therapy  Force (mmHg): 35 mmHg  Duration : 45 minutes  Skin Integumentary  Skin Color: Ecchymosis (comment)  Other Decongestion Therapy  Force (mmHg): 35 mmHg  Duration : 45 minutes      Assessment  Assessment: MLD to B LE followed by pumps at 35 mmHg for 60 min. Wrapping to B LE to approximately 30 mmHg. Measurements taken this visit. Therapy Prognosis: Fair        Decision Making: Medium Complexity    Patient Education  Patient Education: Measurements  Pt verbalized/demonstrated good understanding:     [x] Yes         [] No, pt required further clarification.          Goals  Short Term Goals  Time Frame for Short Term Goals: 3 weeks  Short Term Goal 1: Pt will be educated on her POC and HEP-met  Short Term Goal 2: Pt will initiate pump protocol-met    Long Term Goals  Time Frame for Long Term Goals : 6 weeks  Long Term Goal 1: Pt will be safe and independent with lymphedema management- in progress  Long Term Goal 2: Pt will demonstrate a 2-3cm decrease in BLE girth measurements in order to reduce any new or worsening skin conditions - progressing  Long Term Goal 3: Pt will be fitted for custom compression in order to management lymphedema-progressing  Long Term Goal 4: Pt will be fitted for an at home pump in order t o reduce BLE girth measurements and reduce pain- progressing      Patient Goals : \"to decrease BLE swelling and reduce pain\"        Minutes Tracking:  Time In: 1030  Time Out: [address] [facility]  Minutes: 75       [place], [place]   Date: 2/1/2023

## 2023-02-03 ENCOUNTER — HOSPITAL ENCOUNTER (OUTPATIENT)
Dept: PHYSICAL THERAPY | Age: 78
Setting detail: THERAPIES SERIES
Discharge: HOME | End: 2023-02-03
Payer: MEDICARE

## 2023-02-03 PROCEDURE — 97110 THERAPEUTIC EXERCISES: CPT

## 2023-02-03 PROCEDURE — 97140 MANUAL THERAPY 1/> REGIONS: CPT

## 2023-02-03 NOTE — PROGRESS NOTES
Phone: [address]          Fax: [phone]                      Outpatient Physical Therapy                                                             Lymphedema Treatment  Date: 2/3/2023  Patient: Vicki Newell  : 1945  [facility] #: [id number]  Referring Physician: Kimberly Trujillo DO          Treatment Diagnosis: BLE lymphedema  Onset Date: 22  PT Insurance Information: Medicaid  Total # of Visits Approved: 36   Total # of Visits to Date: 31  No Show: 0  Canceled Appointment: 5     Subjective  Subjective: Pt reports B feet pain 4/10, L knee 5/10. Pt states she noticed increased swelling on L LE and was more painful this morning before she got into her chair, with relief upon sitting.        Lymph Assessment  Skin Integumentary:   Skin Color: Ecchymosis (comment)  Skin Texture: Dry  Turgor: Shiney/hard  Edema Rebound: Quick  Stemmer Sign: Positive    Signs of Constriction (if applicable):   Papilloma (benign tumor arising from an epithelial layer): No  Fibrotic Areas: No  Lymphorrhea: No    Stage of Lymphedema: Stage 2: Spontaneously Irreversible Stage  Description:      Lymphedema Classification:   Type: Secondary Lymphedema  Left: Moderate     Right: Moderate    Measurements: Area Measured: R LE, L LE      Right Measurements Left Measurements   R LE Pre Girth Measurement (cm)  5th Tuberosity (cm): 26.2  Lateral malleolus: 30.7  Calf (cm): 62.7  Total Girth (cm): 119.6 L LE Pre Girth Measurement (cm)  5th Tuberosity (cm): 28  Lateral malleolus: 31.4  Calf (cm): 61.3  Total Girth (cm): 120.7           Exercises:  Exercise 1: HEP pt educated on keeping her legs elevated and compressed at 30mmHg, perform gentle exercises daily including ankle pumps, and reducing sodium and sugar intake    Manual:  Soft Tissue Mobilizaton: MLD to B LEs  Other: Wrapping to B LE     Skin Integumentary  Skin Color: Ecchymosis (comment)  Turgor: Shiney/hard  RLE Complete Decongestion Therapy  Scale: Stage 2  Lymph Drainage Pattern: Lower extremity  Compression Technique: Vaso-pneumatic pump  Force (mmHg): 35 mmHg  Duration: 60 minutes  LLE Complete Decongestion Therapy  Scale: Stage 2  Lymph Drainage Pattern: Lower extremity  Compression Technique: Vaso-pneumatic pump  Force (mmHg): 35 mmHg  Duration : 60 minutes  Other Decongestion Therapy  Force (mmHg): 35 mmHg  Duration : 60 minutes  Skin Integumentary  Skin Color: Ecchymosis (comment)  Turgor: Shiney/hard  Other Decongestion Therapy  Force (mmHg): 35 mmHg  Duration : 60 minutes      Assessment  Assessment: MLD to B LE followed by pumps at 35 mmHg for 60 min. POC update, compression wrapping to approximately 30 mmHg and measurements completed by Kell De Los Santos DPT. Therapy Prognosis: Fair        Decision Making: Medium Complexity    Patient Education  Patient Education: POC  Pt verbalized/demonstrated good understanding:     [x] Yes         [] No, pt required further clarification.          Goals  Short Term Goals  Time Frame for Short Term Goals: 3 weeks  Short Term Goal 1: Pt will be educated on her POC and HEP-met  Short Term Goal 2: Pt will initiate pump protocol-met    Long Term Goals  Time Frame for Long Term Goals : 6 weeks  Long Term Goal 1: Pt will be safe and independent with lymphedema management- in progress  Long Term Goal 2: Pt will demonstrate a 2-3cm decrease in BLE girth measurements in order to reduce any new or worsening skin conditions - progressing  Long Term Goal 3: Pt will be fitted for custom compression in order to management lymphedema-progressing  Long Term Goal 4: Pt will be fitted for an at home pump in order t o reduce BLE girth measurements and reduce pain- progressing      Patient Goals : \"to decrease BLE swelling and reduce pain\"        Minutes Tracking:  Time In: 1305  Time Out: 1427  Minutes: 80       Alverto Ramírez, PTA   Date: 2/3/2023

## 2023-02-03 NOTE — PLAN OF CARE
[facility]           Phone: [phone]             Outpatient Physical Therapy  Fax: [phone]                                           Date: 2/3/2023  Patient: Irina Handy : 1945 [facility] #: [id number]   Referring Physician: Nat Templeton DO      [] Plan of Care   [x] Updated Plan of Care    Dates of Service to Include: 2/3/2023 to 23    Diagnosis:       Rehab (Treatment) Diagnosis:  BLE lymphedema             Onset Date:  22    Attendance  Total # of Visits to Date: 31 No Show: 0 Canceled Appointment: 5    Assessment  Assessment: MLD to B LE followed by pumps at 35 mmHg for 60 min. POC update, compression wrapping to approximately 30 mmHg and measurements completed by Dona Hernandez DPT. Measurements this date R LE midfoot 26.2cm, ankle 30.7cm, midcalf 62.7cm: L LE midfoot 28cm, ankle 31.4cm, midcalf 61.3cm      Goals  Short Term Goals  Time Frame for Short Term Goals: 3 weeks  Short Term Goal 1: Pt will be educated on her POC and HEP-met  Short Term Goal 2: Pt will initiate pump protocol-met  Long Term Goals  Time Frame for Long Term Goals : 6 weeks  Long Term Goal 1: Pt will be safe and independent with lymphedema management- in progress  Long Term Goal 2: Pt will demonstrate a 2-3cm decrease in BLE girth measurements in order to reduce any new or worsening skin conditions - progressing  Long Term Goal 3: Pt will be fitted for custom compression in order to management lymphedema-progressing  Long Term Goal 4: Pt will be fitted for an at home pump in order t o reduce BLE girth measurements and reduce pain- progressing     Prognosis  Therapy Prognosis: Fair    Treatment Plan   Plan Frequency: 2x/wk  Plan weeks: 4-6 weeks  [] HP/CP      [] Electrical Stim   [x] Therapeutic Exercise      [] Gait Training  [] Aquatics   [] Ultrasound         [x] Patient Education/HEP   [x] Manual Therapy  []  Traction    [] Neuro-gildardo        [x] Soft Tissue Mobs            [] Home TENS  [x] lymphedema    [] Orthotic casting/fitting      [] Dry Needling  [] Blood Flow Restriction             Electronically signed by: Diane Holloway PT, DPT    Date: 2/3/2023      ______________________________________ Date: 2/3/2023   Physician Signature

## 2023-02-08 ENCOUNTER — HOSPITAL ENCOUNTER (OUTPATIENT)
Dept: PHYSICAL THERAPY | Age: 78
Setting detail: THERAPIES SERIES
Discharge: HOME | End: 2023-02-08
Payer: MEDICARE

## 2023-02-08 PROCEDURE — 97110 THERAPEUTIC EXERCISES: CPT

## 2023-02-08 PROCEDURE — 97140 MANUAL THERAPY 1/> REGIONS: CPT

## 2023-02-08 NOTE — PROGRESS NOTES
Phone: [phone]          Fax: [phone]                      Outpatient Physical Therapy                                                             Lymphedema Treatment  Date: 2023  Patient: Esthela Allen  : 1945  [facility] #: [id number]  Referring Physician: Gabriela Romano DO          Treatment Diagnosis: BLE lymphedema  Onset Date: 22  PT Insurance Information: Medicaid  Total # of Visits Approved: 36   Total # of Visits to Date: 32  No Show: 0  Canceled Appointment: 5     Subjective  Subjective: Pt reports 5-6/10 B feet pain today. Pt states her pain was not as bad last night as it was last week before her wednesday treatment.        Lymph Assessment  Skin Integumentary:   Skin Texture: Dry  Turgor: Shiney/hard  Edema Rebound: Quick  Stemmer Sign: Positive    Signs of Constriction (if applicable):   Papilloma (benign tumor arising from an epithelial layer): No  Fibrotic Areas: No  Lymphorrhea: No    Stage of Lymphedema: Stage 2: Spontaneously Irreversible Stage  Description:      Lymphedema Classification:   Type: Secondary Lymphedema  Left: Moderate     Right: Moderate      Exercises:  Exercise 1: HEP pt educated on keeping her legs elevated and compressed at 30mmHg, perform gentle exercises daily including ankle pumps, and reducing sodium and sugar intake    Manual:  Soft Tissue Mobilizaton: MLD to B LEs  Other: Wrapping to B LE     Skin Integumentary  Turgor: Shiney/hard  RLE Complete Decongestion Therapy  Scale: Stage 2  Lymph Drainage Pattern: Lower extremity  Compression Technique: Vaso-pneumatic pump  Force (mmHg): 35 mmHg  Duration: 60 minutes  LLE Complete Decongestion Therapy  Scale: Stage 2  Lymph Drainage Pattern: Lower extremity  Compression Technique: Vaso-pneumatic pump  Force (mmHg): 35 mmHg  Duration : 60 minutes  Other Decongestion Therapy  Force (mmHg): 35 mmHg  Duration : 60 minutes  Skin Integumentary  Turgor: Shiney/hard  Other Decongestion Therapy  Force (mmHg): 35 mmHg  Duration : 60 minutes      Assessment  Assessment: MLD to B LE followed by pumps at 35 mmHg for 60 min. Pt with good decrease in edema post tx, wrapped B LE to approximately 30 mmHg. Therapy Prognosis: Fair        Decision Making: Medium Complexity    Patient Education  Patient Education: HEP  Pt verbalized/demonstrated good understanding:     [x] Yes         [] No, pt required further clarification.          Goals  Short Term Goals  Time Frame for Short Term Goals: 3 weeks  Short Term Goal 1: Pt will be educated on her POC and HEP-met  Short Term Goal 2: Pt will initiate pump protocol-met    Long Term Goals  Time Frame for Long Term Goals : 6 weeks  Long Term Goal 1: Pt will be safe and independent with lymphedema management- in progress  Long Term Goal 2: Pt will demonstrate a 2-3cm decrease in BLE girth measurements in order to reduce any new or worsening skin conditions - progressing  Long Term Goal 3: Pt will be fitted for custom compression in order to management lymphedema-progressing  Long Term Goal 4: Pt will be fitted for an at home pump in order t o reduce BLE girth measurements and reduce pain- progressing      Patient Goals : \"to decrease BLE swelling and reduce pain\"        Minutes Tracking:  Time In: 0905  Time Out: [address]  Minutes: [address] [place] [address]   Date: 2/8/2023

## 2023-02-10 ENCOUNTER — HOSPITAL ENCOUNTER (OUTPATIENT)
Dept: PHYSICAL THERAPY | Age: 78
Setting detail: THERAPIES SERIES
Discharge: HOME | End: 2023-02-10
Payer: MEDICARE

## 2023-02-10 PROCEDURE — 97110 THERAPEUTIC EXERCISES: CPT

## 2023-02-10 PROCEDURE — 97140 MANUAL THERAPY 1/> REGIONS: CPT

## 2023-02-10 NOTE — PROGRESS NOTES
Phone: [phone] [facility]          Fax: [phone]                      Outpatient Physical Therapy                                                             Lymphedema treatment  Date: 2/10/2023  Patient: Eleanor Bell  : 1945  [facility] #: [id number]  Referring Physician: Daniel Griggs DO     Diagnosis: BLE lymphedema    Treatment Diagnosis: BLE lymphedema  Onset Date: 22  PT Insurance Information: Medicaid  Total # of Visits Approved: 36   Total # of Visits to Date: 33     Subjective  Subjective: Pt reports her R LE was more swollen yesterday and this morning but has since gone down  Additional Pertinent Hx: depression, type 2 diabetes, anxiety, hypothyroidism, mitral valve disorder, obesity, hyperlipidemia, a fib, OA, CHF    Lymph Assessment      Skin Integumentary:   Skin Texture: Dry  Edema Rebound: Quick  Stemmer Sign: Positive    Signs of Constriction (if applicable):   Papilloma (benign tumor arising from an epithelial layer): No  Fibrotic Areas: No  Lymphorrhea: No    Stage of Lymphedema: Stage 2: Spontaneously Irreversible Stage  Description:      Lymphedema Classification:   Type: Secondary Lymphedema  Left: Moderate     Right: Moderate        Exercises:  Exercise 1: HEP pt educated on keeping her legs elevated and compressed at 30mmHg, perform gentle exercises daily including ankle pumps, and reducing sodium and sugar intake    Manual:        RLE Complete Decongestion Therapy  Lymph Drainage Pattern: Lower extremity (MLd to R LE)  Compression Technique: Vaso-pneumatic pump  Force (mmHg): 35 mmHg  Duration: 60 minutes  LLE Complete Decongestion Therapy  Lymph Drainage Pattern: Lower extremity (MLD to L LE)  Compression Technique: Vaso-pneumatic pump  Force (mmHg): 35 mmHg  Duration : 60 minutes  Other Decongestion Therapy  Force (mmHg): 35 mmHg  Duration : 60 minutes  Other Decongestion Therapy  Force (mmHg): 35 mmHg  Duration : 60 minutes      Assessment  Assessment: MLD to BLE, no hardening of skin tissue this date with good movement and no pitting edema. PT tolerated 60 minutes of pumps at 35mmHg this date followed by wraps at 30mmHg. Therapy Prognosis: Fair        Decision Making: Medium Complexity    Patient Education  Patient Education: educated on reps messages to facility  Pt verbalized/demonstrated good understanding:     [x] Yes         [] No, pt required further clarification.          Goals  Short Term Goals  Time Frame for Short Term Goals: 3 weeks  Short Term Goal 1: Pt will be educated on her POC and HEP-met  Short Term Goal 2: Pt will initiate pump protocol-met    Long Term Goals  Time Frame for Long Term Goals : 6 weeks  Long Term Goal 1: Pt will be safe and independent with lymphedema management- in progress  Long Term Goal 2: Pt will demonstrate a 2-3cm decrease in BLE girth measurements in order to reduce any new or worsening skin conditions - progressing  Long Term Goal 3: Pt will be fitted for custom compression in order to management lymphedema-progressing  Long Term Goal 4: Pt will be fitted for an at home pump in order t o reduce BLE girth measurements and reduce pain- progressing      Patient Goals : \"to decrease BLE swelling and reduce pain\"        Minutes Tracking:  Time In: 1009  Time Out: 1125  Minutes: 76  Timed Code Treatment Minutes: 74 Minutes    Tomas Shepherd, PT, DPT   Date: 2/10/2023

## 2023-02-14 ENCOUNTER — HOSPITAL ENCOUNTER (OUTPATIENT)
Dept: PHYSICAL THERAPY | Age: 78
Setting detail: THERAPIES SERIES
Discharge: HOME | End: 2023-02-14
Payer: MEDICARE

## 2023-02-14 NOTE — PROGRESS NOTES
[facility]  Inpatient/Observation/Outpatient Rehabilitation    Date: 2023  Patient Name: Gustabo Player       [] Inpatient Acute/Observation       []  Outpatient  : 1945       [] Pt no showed for scheduled appointment    [] Pt refused/declined therapy at this time due to:           [x] Pt cancelled due to:  [] No Reason Given   [x] Sick/ill   [] Other:    Therapist/Assistant will attempt to see this patient, at our earliest opportunity.        Juan Manuel Forrest Date: 2023

## 2023-02-15 ENCOUNTER — HOSPITAL ENCOUNTER (OUTPATIENT)
Dept: PHYSICAL THERAPY | Age: 78
Setting detail: THERAPIES SERIES
Discharge: HOME | End: 2023-02-15
Payer: MEDICARE

## 2023-02-15 PROCEDURE — 97140 MANUAL THERAPY 1/> REGIONS: CPT

## 2023-02-15 PROCEDURE — 97110 THERAPEUTIC EXERCISES: CPT

## 2023-02-15 NOTE — PROGRESS NOTES
Phone: [phone] [facility]          Fax: [phone]                      Outpatient Physical Therapy                                                             Lymphedema treatment  Date: 2/15/2023  Patient: Angela Marr  : 1945  [facility] #: [id number]  Referring Physician: Milind Kerr DO     Diagnosis: BLE lymphedema    Treatment Diagnosis: BLE lymphedema  Onset Date: 22  PT Insurance Information: Medicaid  Total # of Visits Approved: 36   Total # of Visits to Date: 34     Subjective  Subjective: Pt reports her leg pain is a lot less and that her legs are down in size today  Additional Pertinent Hx: depression, type 2 diabetes, anxiety, hypothyroidism, mitral valve disorder, obesity, hyperlipidemia, a fib, OA, CHF    Lymph Assessment      Skin Integumentary:   Skin Color: Ecchymosis (comment)  Skin Texture: Dry  Edema Rebound: Quick  Stemmer Sign: Positive    Signs of Constriction (if applicable):   Papilloma (benign tumor arising from an epithelial layer): No  Fibrotic Areas: No  Lymphorrhea: No    Stage of Lymphedema: Stage 2: Spontaneously Irreversible Stage  Description:      Lymphedema Classification:   Type: Secondary Lymphedema  Left: Moderate     Right: Moderate      Exercises:  Exercise 1: HEP pt educated on keeping her legs elevated and compressed at 30mmHg, perform gentle exercises daily including ankle pumps, and reducing sodium and sugar intake    Manual:        Skin Integumentary  Skin Color: Ecchymosis (comment)  RLE Complete Decongestion Therapy  Lymph Drainage Pattern: Lower extremity (MLD to R LE)  Compression Technique: Vaso-pneumatic pump  Force (mmHg): 35 mmHg  Duration: 60 minutes  LLE Complete Decongestion Therapy  Lymph Drainage Pattern: Lower extremity (MLD to L LE)  Force (mmHg): 35 mmHg  Duration : 60 minutes  Other Decongestion Therapy  Force (mmHg): 35 mmHg  Duration : 60 minutes  Skin Integumentary  Skin Color: Ecchymosis (comment)  Other Decongestion Therapy  Force (mmHg): 35 mmHg  Duration : 60 minutes      Assessment  Assessment: Pt has made signifcant progress since starting therapy. Pt's measurements prior to pump use R LE midfoot 26cm, ankle 29.5cm, midcalf 57.8cm: L LE midfoot 28.2cm, ankle 29.8cm, midcalf 57.6cm  Therapy Prognosis: Fair        Decision Making: Medium Complexity    Patient Education  Patient Education: measurements  Pt verbalized/demonstrated good understanding:     [x] Yes         [] No, pt required further clarification.          Goals  Short Term Goals  Time Frame for Short Term Goals: 3 weeks  Short Term Goal 1: Pt will be educated on her POC and HEP-met  Short Term Goal 2: Pt will initiate pump protocol-met    Long Term Goals  Time Frame for Long Term Goals : 6 weeks  Long Term Goal 1: Pt will be safe and independent with lymphedema management- in progress  Long Term Goal 2: Pt will demonstrate a 2-3cm decrease in BLE girth measurements in order to reduce any new or worsening skin conditions - progressing  Long Term Goal 3: Pt will be fitted for custom compression in order to management lymphedema-progressing  Long Term Goal 4: Pt will be fitted for an at home pump in order t o reduce BLE girth measurements and reduce pain- progressing      Patient Goals : \"to decrease BLE swelling and reduce pain\"        Minutes Tracking:  Time In: 0916  Time Out: [address]  Minutes: 89  Timed Code Treatment Minutes: 87 Minutes    Lavonne Tillman PT, DPT   Date: 2/15/2023

## 2023-02-22 ENCOUNTER — HOSPITAL ENCOUNTER (OUTPATIENT)
Dept: PHYSICAL THERAPY | Age: 78
Setting detail: THERAPIES SERIES
Discharge: HOME | End: 2023-02-22
Payer: MEDICARE

## 2023-02-22 PROCEDURE — 97110 THERAPEUTIC EXERCISES: CPT

## 2023-02-22 PROCEDURE — 97140 MANUAL THERAPY 1/> REGIONS: CPT

## 2023-02-22 NOTE — PROGRESS NOTES
Phone: [phone]          Fax: [phone]                      Outpatient Physical Therapy                                                             Lymphedema treatment  Date: 2023  Patient: Gisselle oYunger  : 1945  [facility] #: [id number]  Referring Physician: Ileana Elmore DO     Diagnosis: BLE lymphedema    Treatment Diagnosis: BLE lymphedema  Onset Date: 22  PT Insurance Information: Medicaid  Total # of Visits Approved: 36   Total # of Visits to Date: 35     Subjective  Subjective: Pt reported her legs feel more swollen since Monday  Additional Pertinent Hx: depression, type 2 diabetes, anxiety, hypothyroidism, mitral valve disorder, obesity, hyperlipidemia, a fib, OA, CHF    Lymph Assessment         Skin Integumentary:   Skin Texture: Dry  Skin Condition/Temp: Dry  Turgor: Shiney/hard  Edema Rebound: Quick  Stemmer Sign: Positive    Signs of Constriction (if applicable):   Papilloma (benign tumor arising from an epithelial layer): No  Fibrotic Areas: No  Lymphorrhea: No    Stage of Lymphedema: Stage 2: Spontaneously Irreversible Stage  Description:      Lymphedema Classification:   Type: Secondary Lymphedema  Left: Moderate     Right: Moderate      Exercises:  Exercise 1: HEP pt educated on keeping her legs elevated and compressed at 30mmHg, perform gentle exercises daily including ankle pumps, and reducing sodium and sugar intake    Manual:        Skin Integumentary  Skin Condition/Temp: Dry  Turgor: Shiney/hard  RLE Complete Decongestion Therapy  Lymph Drainage Pattern: Lower extremity (MLD to R LE)  Compression Technique: Vaso-pneumatic pump  Force (mmHg): 35 mmHg  Duration: 60 minutes  LLE Complete Decongestion Therapy  Lymph Drainage Pattern: Lower extremity (MLD to L LE)  Compression Technique: Vaso-pneumatic pump  Force (mmHg): 35 mmHg  Duration : 60 minutes  Other Decongestion Therapy  Force (mmHg): 35 mmHg  Duration : 60 minutes  Skin Integumentary  Skin Condition/Temp: Dry  Turgor: Shiney/hard  Other Decongestion Therapy  Force (mmHg): 35 mmHg  Duration : 60 minutes      Assessment  Assessment: MLd performed to BLE with good tolerance, followed by pump use at 35mmHg due to tolerance and compression applied at 30mmHg. Rep stated pump should be being ordered soon  Therapy Prognosis: Fair        Decision Making: Medium Complexity    Patient Education  Patient Education: pump progress  Pt verbalized/demonstrated good understanding:     [x] Yes         [] No, pt required further clarification.          Goals  Short Term Goals  Time Frame for Short Term Goals: 3 weeks  Short Term Goal 1: Pt will be educated on her POC and HEP-met  Short Term Goal 2: Pt will initiate pump protocol-met    Long Term Goals  Time Frame for Long Term Goals : 6 weeks  Long Term Goal 1: Pt will be safe and independent with lymphedema management- in progress  Long Term Goal 2: Pt will demonstrate a 2-3cm decrease in BLE girth measurements in order to reduce any new or worsening skin conditions - progressing  Long Term Goal 3: Pt will be fitted for custom compression in order to management lymphedema-progressing  Long Term Goal 4: Pt will be fitted for an at home pump in order t o reduce BLE girth measurements and reduce pain- progressing      Patient Goals : \"to decrease BLE swelling and reduce pain\"        Minutes Tracking:  Time In: 0916  Time Out: 1037  Minutes: 81  Timed Code Treatment Minutes: 78 Minutes    Kell De Los Santos, PT, DPT   Date: 2/22/2023

## 2023-02-24 ENCOUNTER — HOSPITAL ENCOUNTER (OUTPATIENT)
Dept: PHYSICAL THERAPY | Age: 78
Setting detail: THERAPIES SERIES
Discharge: HOME | End: 2023-02-24
Payer: MEDICARE

## 2023-02-24 PROCEDURE — 97110 THERAPEUTIC EXERCISES: CPT

## 2023-02-24 PROCEDURE — 97140 MANUAL THERAPY 1/> REGIONS: CPT

## 2023-02-24 NOTE — PROGRESS NOTES
Phone: [phone] [facility]          Fax: [phone]                      Outpatient Physical Therapy                                                             Lymphedema treatment  Date: 2023  Patient: Alda Irby  : 1945  [facility] #: [id number]  Referring Physician: Shaye Beltran DO     Diagnosis: BLE lymphedema    Treatment Diagnosis: BLE lymphedema  Onset Date: 22  PT Insurance Information: Medicaid  Total # of Visits Approved: 50   Total # of Visits to Date: 36     Subjective  Subjective: No complaints this date  Additional Pertinent Hx: depression, type 2 diabetes, anxiety, hypothyroidism, mitral valve disorder, obesity, hyperlipidemia, a fib, OA, CHF    Lymph Assessment      Skin Integumentary:   Skin Color: Ecchymosis (comment)  Skin Texture: Dry  Edema Rebound: Quick  Stemmer Sign: Positive    Signs of Constriction (if applicable):   Papilloma (benign tumor arising from an epithelial layer): No  Fibrotic Areas: No  Lymphorrhea: No    Stage of Lymphedema: Stage 2: Spontaneously Irreversible Stage  Description:      Lymphedema Classification:   Type: Secondary Lymphedema  Left: Moderate     Right: Moderate      Exercises:  Exercise 1: HEP pt educated on keeping her legs elevated and compressed at 30mmHg, perform gentle exercises daily including ankle pumps, and reducing sodium and sugar intake    Manual:     Skin Integumentary  Skin Color: Ecchymosis (comment)  RLE Complete Decongestion Therapy  Lymph Drainage Pattern: Lower extremity (MLD to R LE)  Compression Technique: Vaso-pneumatic pump  Force (mmHg): 35 mmHg  Duration: 60 minutes  LLE Complete Decongestion Therapy  Lymph Drainage Pattern: Lower extremity (MLD to L LE)  Compression Technique: Vaso-pneumatic pump  Force (mmHg): 35 mmHg  Duration : 60 minutes  Other Decongestion Therapy  Force (mmHg): 35 mmHg  Duration : 60 minutes  Skin Integumentary  Skin Color: Ecchymosis (comment)  Other Decongestion Therapy  Force (mmHg): 35 mmHg  Duration : 60 minutes      Assessment  Assessment: No word on receiving her pump yet but was measured by the DON. MLd performed to BLE followed by pumps at 35mmHg. Lymphedema wraps applied at 30-40mmHg to reduce edema  Therapy Prognosis: Fair        Decision Making: Medium Complexity    Patient Education  Patient Education: continue wrapping  Pt verbalized/demonstrated good understanding:     [x] Yes         [] No, pt required further clarification.          Goals  Short Term Goals  Time Frame for Short Term Goals: 3 weeks  Short Term Goal 1: Pt will be educated on her POC and HEP-met  Short Term Goal 2: Pt will initiate pump protocol-met    Long Term Goals  Time Frame for Long Term Goals : 6 weeks  Long Term Goal 1: Pt will be safe and independent with lymphedema management- in progress  Long Term Goal 2: Pt will demonstrate a 2-3cm decrease in BLE girth measurements in order to reduce any new or worsening skin conditions - progressing  Long Term Goal 3: Pt will be fitted for custom compression in order to management lymphedema-progressing  Long Term Goal 4: Pt will be fitted for an at home pump in order t o reduce BLE girth measurements and reduce pain- progressing      Patient Goals : \"to decrease BLE swelling and reduce pain\"        Minutes Tracking:  Time In: 0845  Time Out: 1004  Minutes: 79  Timed Code Treatment Minutes: [address], PT, DPT   Date: 2/24/2023

## 2023-03-01 ENCOUNTER — HOSPITAL ENCOUNTER (OUTPATIENT)
Dept: PHYSICAL THERAPY | Age: 78
Setting detail: THERAPIES SERIES
Discharge: HOME | End: 2023-03-01
Payer: MEDICARE

## 2023-03-01 PROCEDURE — 97140 MANUAL THERAPY 1/> REGIONS: CPT

## 2023-03-01 PROCEDURE — 97110 THERAPEUTIC EXERCISES: CPT

## 2023-03-01 NOTE — PROGRESS NOTES
Phone: [phone]          Fax: [phone]                      Outpatient Physical Therapy                                                             Lymphedema treatment  Date: 3/1/2023  Patient: Elba Galvan  : 1945  [facility] #: [id number]  Referring Physician: Nunu Link DO     Diagnosis: BLE lymphedema    Treatment Diagnosis: BLE lymphedema  Onset Date: 22  PT Insurance Information: Medicaid  Total # of Visits Approved: 50   Total # of Visits to Date: 37     Subjective  Subjective: Pt reports R shin and foot hurts today  Additional Pertinent Hx: depression, type 2 diabetes, anxiety, hypothyroidism, mitral valve disorder, obesity, hyperlipidemia, a fib, OA, CHF    Lymph Assessment      Skin Integumentary:   Skin Color: Ecchymosis (comment)  Skin Texture: Dry  Edema Rebound: Quick  Stemmer Sign: Positive    Signs of Constriction (if applicable):   Papilloma (benign tumor arising from an epithelial layer): No  Fibrotic Areas: No  Lymphorrhea: No    Stage of Lymphedema: Stage 2: Spontaneously Irreversible Stage  Description:      Lymphedema Classification:   Type: Secondary Lymphedema  Left: Moderate     Right: Moderate        Exercises:  Exercise 1: HEP pt educated on keeping her legs elevated and compressed at 30mmHg, perform gentle exercises daily including ankle pumps, and reducing sodium and sugar intake    Manual:        Skin Integumentary  Skin Color: Ecchymosis (comment)  RLE Complete Decongestion Therapy  Lymph Drainage Pattern: Lower extremity (MLD to R LE)  Compression Technique: Vaso-pneumatic pump  Force (mmHg): 35 mmHg  Duration: 60 minutes  LLE Complete Decongestion Therapy  Lymph Drainage Pattern: Lower extremity (MLD to L LE)  Compression Technique: Vaso-pneumatic pump  Force (mmHg): 35 mmHg  Duration : 60 minutes  Other Decongestion Therapy  Force (mmHg): 35 mmHg  Duration : 60 minutes  Skin Integumentary  Skin Color: Ecchymosis (comment)  Other Decongestion Therapy  Force (mmHg): 35 mmHg  Duration : 60 minutes      Assessment  Assessment: MLD performed to BLE, MLD to R LE caused increased pain this date with R foot warm to touch. Good tolerance with L MLD. Pumps at 35mmHg with good tolerance. Pt educated on DVT risk  Therapy Prognosis: Fair        Decision Making: Medium Complexity    Patient Education  Patient Education: pump progress  Pt verbalized/demonstrated good understanding:     [x] Yes         [] No, pt required further clarification.          Goals  Short Term Goals  Time Frame for Short Term Goals: 3 weeks  Short Term Goal 1: Pt will be educated on her POC and HEP-met  Short Term Goal 2: Pt will initiate pump protocol-met    Long Term Goals  Time Frame for Long Term Goals : 6 weeks  Long Term Goal 1: Pt will be safe and independent with lymphedema management- in progress  Long Term Goal 2: Pt will demonstrate a 2-3cm decrease in BLE girth measurements in order to reduce any new or worsening skin conditions - progressing  Long Term Goal 3: Pt will be fitted for custom compression in order to management lymphedema-progressing  Long Term Goal 4: Pt will be fitted for an at home pump in order t o reduce BLE girth measurements and reduce pain- progressing      Patient Goals : \"to decrease BLE swelling and reduce pain\"        Minutes Tracking:  Time In: 0909  Time Out: 1030  Minutes: 81  Timed Code Treatment Minutes: 78 Minutes    Tommy Daugherty, PT, DPT   Date: 3/1/2023

## 2023-03-03 ENCOUNTER — HOSPITAL ENCOUNTER (OUTPATIENT)
Dept: PHYSICAL THERAPY | Age: 78
Setting detail: THERAPIES SERIES
Discharge: HOME | End: 2023-03-03
Payer: MEDICARE

## 2023-03-03 PROCEDURE — 97110 THERAPEUTIC EXERCISES: CPT

## 2023-03-03 PROCEDURE — 97140 MANUAL THERAPY 1/> REGIONS: CPT

## 2023-03-06 ENCOUNTER — HOSPITAL ENCOUNTER (OUTPATIENT)
Dept: PHYSICAL THERAPY | Age: 78
Setting detail: THERAPIES SERIES
Discharge: HOME | End: 2023-03-06
Payer: MEDICARE

## 2023-03-06 PROCEDURE — 97140 MANUAL THERAPY 1/> REGIONS: CPT

## 2023-03-06 PROCEDURE — 97110 THERAPEUTIC EXERCISES: CPT

## 2023-03-06 NOTE — PROGRESS NOTES
Phone: [address]          Fax: [phone]                      Outpatient Physical Therapy                                                             Lymphedema Treatment  Date: 3/6/2023  Patient: Chantelle Clay  : 1945  [facility] #: [id number]  Referring Physician: Alvarado Kirby DO          Treatment Diagnosis: BLE lymphedema  Onset Date: 22  PT Insurance Information: Medicaid  Total # of Visits Approved: 48   Total # of Visits to Date: 28  No Show: 0  Canceled Appointment: 6     Subjective  Subjective: Pt reports \"normal\" B foot pain today. Pt states she did not have her legs wrapped this morning since her appt today was early.        Lymph Assessment    Skin Integumentary:   Skin Texture: Dry  Turgor: Shiney/hard  Edema Rebound: Quick  Stemmer Sign: Positive    Signs of Constriction (if applicable):   Papilloma (benign tumor arising from an epithelial layer): No  Fibrotic Areas: No  Lymphorrhea: No    Stage of Lymphedema: Stage 2: Spontaneously Irreversible Stage  Description:      Lymphedema Classification:   Type: Secondary Lymphedema  Left: Moderate     Right: Moderate      Exercises:  Exercise 1: HEP pt educated on keeping her legs elevated and compressed at 30mmHg, perform gentle exercises daily including ankle pumps, and reducing sodium and sugar intake    Manual:  Soft Tissue Mobilizaton: MLD to B LEs  Other: Wrapping to B LE     Skin Integumentary  Turgor: Shiney/hard  RLE Complete Decongestion Therapy  Scale: Stage 2  Compression Technique: Vaso-pneumatic pump  Force (mmHg): 35 mmHg  Duration: 60 minutes  LLE Complete Decongestion Therapy  Scale: Stage 2  Lymph Drainage Pattern: Lower extremity  Compression Technique: Vaso-pneumatic pump  Force (mmHg): 35 mmHg  Duration : 60 minutes  Other Decongestion Therapy  Force (mmHg): 35 mmHg  Duration : 60 minutes  Skin Integumentary  Turgor: Shiney/hard  Other Decongestion Therapy  Force (mmHg): 35 mmHg  Duration : 60 minutes      Assessment  Assessment: MLD performed to BLE, with good tolerance. Pumps at 35mmHg with good tolerance. Pt denies increase in pain or discomfort this visit. Decision Making: Medium Complexity    Patient Education  Patient Education: HEP  Pt verbalized/demonstrated good understanding:     [x] Yes         [] No, pt required further clarification.          Goals  Short Term Goals  Time Frame for Short Term Goals: 3 weeks  Short Term Goal 1: Pt will be educated on her POC and HEP-met  Short Term Goal 2: Pt will initiate pump protocol-met    Long Term Goals  Time Frame for Long Term Goals : 6 weeks  Long Term Goal 1: Pt will be safe and independent with lymphedema management- in progress  Long Term Goal 2: Pt will demonstrate a 2-3cm decrease in BLE girth measurements in order to reduce any new or worsening skin conditions - progressing  Long Term Goal 3: Pt will be fitted for custom compression in order to management lymphedema-progressing  Long Term Goal 4: Pt will be fitted for an at home pump in order t o reduce BLE girth measurements and reduce pain- progressing      Patient Goals : \"to decrease BLE swelling and reduce pain\"        Minutes Tracking:  Time In: 1326  Time Out: [address]  Minutes: [address] [employer] [place], [place]   Date: 3/6/2023

## 2023-03-08 ENCOUNTER — HOSPITAL ENCOUNTER (OUTPATIENT)
Dept: PHYSICAL THERAPY | Age: 78
Setting detail: THERAPIES SERIES
Discharge: HOME | End: 2023-03-08
Payer: MEDICARE

## 2023-03-08 PROCEDURE — 97110 THERAPEUTIC EXERCISES: CPT

## 2023-03-08 PROCEDURE — 97140 MANUAL THERAPY 1/> REGIONS: CPT

## 2023-03-08 NOTE — PROGRESS NOTES
Phone: [address]          Fax: [phone]                      Outpatient Physical Therapy                                                             Lymphedema Treatment  Date: 3/8/2023  Patient: Tavares De La Torre  : 1945  [facility] #: [id number]  Referring Physician: Annelise Rodriguez DO          Treatment Diagnosis: BLE lymphedema  Onset Date: 22  PT Insurance Information: Medicaid  Total # of Visits Approved: 48   Total # of Visits to Date:   No Show: 0  Canceled Appointment: 6     Subjective  Subjective: Pt states she is feeling ok today, mild tenderness this morning to B LE.        Lymph Assessment    Skin Integumentary:   Skin Texture: Dry  Turgor: Shiney/hard  Edema Rebound: Quick  Stemmer Sign: Positive    Signs of Constriction (if applicable):   Papilloma (benign tumor arising from an epithelial layer): No  Fibrotic Areas: No  Lymphorrhea: No    Stage of Lymphedema: Stage 2: Spontaneously Irreversible Stage  Description:      Lymphedema Classification:   Type: Secondary Lymphedema  Left: Moderate     Right: Moderate        Exercises:  Exercise 1: HEP pt educated on keeping her legs elevated and compressed at 30mmHg, perform gentle exercises daily including ankle pumps, and reducing sodium and sugar intake    Manual:  Soft Tissue Mobilizaton: MLD to B LEs  Other: Wrapping to B LE     Skin Integumentary  Turgor: Shiney/hard  RLE Complete Decongestion Therapy  Scale: Stage 2  Lymph Drainage Pattern: Lower extremity  Compression Technique: Vaso-pneumatic pump  Force (mmHg): 35 mmHg  Duration: 60 minutes  LLE Complete Decongestion Therapy  Scale: Stage 2  Lymph Drainage Pattern: Lower extremity  Compression Technique: Vaso-pneumatic pump  Force (mmHg): 35 mmHg  Duration : 60 minutes  Other Decongestion Therapy  Force (mmHg): 35 mmHg  Duration : 60 minutes  Skin Integumentary  Turgor: Shiney/hard  Other Decongestion Therapy  Force (mmHg): 35 mmHg  Duration : 60 minutes      Assessment  Assessment: MLD to BLE with mild sensitivity to touch. Pumps at 35mmHg for 60 min, focus mode this visit on chambers 1-3, good tolerance and good decrease in edema. Compression wraps applied to 30 mmHg after tx. Therapy Prognosis: Fair        Decision Making: Medium Complexity    Patient Education  Patient Education: Focus mode  Pt verbalized/demonstrated good understanding:     [x] Yes         [] No, pt required further clarification.          Goals  Short Term Goals  Time Frame for Short Term Goals: 3 weeks  Short Term Goal 1: Pt will be educated on her POC and HEP-met  Short Term Goal 2: Pt will initiate pump protocol-met    Long Term Goals  Time Frame for Long Term Goals : 6 weeks  Long Term Goal 1: Pt will be safe and independent with lymphedema management- in progress  Long Term Goal 2: Pt will demonstrate a 2-3cm decrease in BLE girth measurements in order to reduce any new or worsening skin conditions - progressing  Long Term Goal 3: Pt will be fitted for custom compression in order to management lymphedema-progressing  Long Term Goal 4: Pt will be fitted for an at home pump in order t o reduce BLE girth measurements and reduce pain- progressing      Patient Goals : \"to decrease BLE swelling and reduce pain\"        Minutes Tracking:  Time In: 0900  Time Out: 1026  Minutes: [address], [place]   Date: 3/8/2023

## 2023-03-13 ENCOUNTER — HOSPITAL ENCOUNTER (OUTPATIENT)
Dept: PHYSICAL THERAPY | Age: 78
Setting detail: THERAPIES SERIES
Discharge: HOME | End: 2023-03-13
Payer: MEDICARE

## 2023-03-13 PROCEDURE — 97110 THERAPEUTIC EXERCISES: CPT

## 2023-03-13 PROCEDURE — 97140 MANUAL THERAPY 1/> REGIONS: CPT

## 2023-03-13 NOTE — PROGRESS NOTES
Phone: [address] [facility]          Fax: [phone]                      Outpatient Physical Therapy                                                             Lymphedema Treatment  Date: 3/13/2023  Patient: Anna Low  : 1945  [facility] #: [id number]  Referring Physician: Chiqui Kennedy DO          Treatment Diagnosis: BLE lymphedema  Onset Date: 22  PT Insurance Information: Medicaid  Total # of Visits Approved: 48   Total # of Visits to Date: 30  No Show: 0  Canceled Appointment: 6     Subjective  Subjective: Pt reports feeling better today than yesterday, still has her normal pain.        Lymph Assessment    Skin Integumentary:   Skin Texture: Dry  Turgor: Shiney/hard  Edema Rebound: Quick  Stemmer Sign: Positive    Signs of Constriction (if applicable):   Papilloma (benign tumor arising from an epithelial layer): No  Fibrotic Areas: No  Lymphorrhea: No    Stage of Lymphedema: Stage 2: Spontaneously Irreversible Stage  Description:      Lymphedema Classification:   Type: Secondary Lymphedema  Left: Moderate     Right: Moderate      Exercises:  Exercise 1: HEP pt educated on keeping her legs elevated and compressed at 30mmHg, perform gentle exercises daily including ankle pumps, and reducing sodium and sugar intake    Manual:  Soft Tissue Mobilizaton: MLD to B LEs  Other: Wrapping to B LE     Skin Integumentary  Turgor: Shiney/hard  RLE Complete Decongestion Therapy  Scale: Stage 2  Lymph Drainage Pattern: Lower extremity  Compression Technique: Vaso-pneumatic pump  Force (mmHg): 35 mmHg  Duration: 40 minutes  LLE Complete Decongestion Therapy  Scale: Stage 2  Lymph Drainage Pattern: Lower extremity  Compression Technique: Vaso-pneumatic pump  Force (mmHg): 35 mmHg  Duration : 40 minutes  Other Decongestion Therapy  Force (mmHg): 35 mmHg  Duration : 40 minutes  Skin Integumentary  Turgor: Shiney/hard  Other Decongestion Therapy  Force (mmHg): 35 mmHg  Duration : 40 minutes      Assessment  Assessment: MLD to B LE, pt continues to have mild sensitivity to touch  on B LE. Compression pumps at 35 mmHg for 40 min, pt requested to end tx early d/t increased B LE pain. Compression wraps applied at 30 mmHg post tx. Pt reports mild dizziness post tx, offered water, SPO2 96%. Pt reports pain and mild dizziness relief after tx. Therapy Prognosis: Fair        Decision Making: Medium Complexity    Patient Education  Patient Education: Pain control  Pt verbalized/demonstrated good understanding:     [x] Yes         [] No, pt required further clarification.          Goals  Short Term Goals  Time Frame for Short Term Goals: 3 weeks  Short Term Goal 1: Pt will be educated on her POC and HEP-met  Short Term Goal 2: Pt will initiate pump protocol-met    Long Term Goals  Time Frame for Long Term Goals : 6 weeks      Patient Goals : \"to decrease BLE swelling and reduce pain\"        Minutes Tracking:  Time In: 5951  Time Out: [address] [facility]  Minutes: [address] [facility], [facility]  Date: 3/13/2023

## 2023-03-15 ENCOUNTER — HOSPITAL ENCOUNTER (OUTPATIENT)
Dept: PHYSICAL THERAPY | Age: 78
Setting detail: THERAPIES SERIES
Discharge: HOME | End: 2023-03-15
Payer: MEDICARE

## 2023-03-15 PROCEDURE — 97110 THERAPEUTIC EXERCISES: CPT

## 2023-03-15 PROCEDURE — 97140 MANUAL THERAPY 1/> REGIONS: CPT

## 2023-03-15 NOTE — PROGRESS NOTES
Phone: [address] [facility]          Fax: [phone]                      Outpatient Physical Therapy                                                             Lymphedema Treatment  Date: 3/15/2023  Patient: Oumar Nelson  : 1945  [facility] #: [id number]  Referring Physician: Rosio Chavez DO          Treatment Diagnosis: BLE lymphedema  Onset Date: 22  PT Insurance Information: Medicaid  Total # of Visits Approved: 48   Total # of Visits to Date: 31  No Show: 0  Canceled Appointment: 6     Subjective  Subjective: Pt reports 6/10 B foot pain. Pt states her lower legs are not as sensitive today as they have been. Lymph Assessment    Skin Integumentary:   Skin Texture: Dry  Turgor: Shiney/hard  Edema Rebound: Quick  Stemmer Sign: Positive    Signs of Constriction (if applicable):   Papilloma (benign tumor arising from an epithelial layer): No  Fibrotic Areas: No  Lymphorrhea: No    Stage of Lymphedema: Stage 2: Spontaneously Irreversible Stage  Description:      Lymphedema Classification:   Type: Secondary Lymphedema  Left: Moderate     Right: Moderate    Measurements: Area Measured: R LE, L LE (R LE: 3\" above 39.7 cm, 6\" above 51.5 cm.  L LE\" 3\" above 44.5 cm, 6\" above 53 cm.)      Right Measurements Left Measurements   R LE Pre Girth Measurement (cm)  5th Tuberosity (cm): 26.2  Lateral malleolus: 30.3  Calf (cm): 56.7  Mid Knee (cm): 59.5  Total Girth (cm): 172.7 L LE Pre Girth Measurement (cm)  5th Tuberosity (cm): 26  Lateral malleolus: 32.4  Calf (cm): 55  Mid Knee (cm): 56  Total Girth (cm): 169.4         Exercises:  Exercise 1: HEP pt educated on keeping her legs elevated and compressed at 30mmHg, perform gentle exercises daily including ankle pumps, and reducing sodium and sugar intake    Manual:  Soft Tissue Mobilizaton: MLD to B LEs  Other: Wrapping to B LE     Skin Integumentary  Turgor: Shiney/hard  RLE Complete Decongestion Therapy  Scale: Stage 2  Lymph Drainage Pattern: Lower extremity  Compression Technique: Vaso-pneumatic pump  Force (mmHg): 35 mmHg  Duration: 60 minutes  LLE Complete Decongestion Therapy  Scale: Stage 2  Lymph Drainage Pattern: Lower extremity  Compression Technique: Vaso-pneumatic pump  Force (mmHg): 35 mmHg  Duration : 60 minutes  Other Decongestion Therapy  Force (mmHg): 35 mmHg  Duration : 60 minutes  Skin Integumentary  Turgor: Shiney/hard  Other Decongestion Therapy  Force (mmHg): 35 mmHg  Duration : 60 minutes      Assessment  Assessment: MLD to B LE followed by compression pumps at 35 mmHg for 60 min. R LE: ankle 26.2 cm, ankle 30.3 cm, 3\" above 39.7 cm, 6\" above 51.5 cm, midcalf 56.7 cm, knee 59.5 cm. L LE midfoot 26 cm, ankle 32.4 cm, 3\" above 44.5 cm, 6\" above 53 cm, midcalf 55 cm, knee 56 cm. Therapy Prognosis: Fair        Decision Making: Medium Complexity    Patient Education  Patient Education: Measurements  Pt verbalized/demonstrated good understanding:     [x] Yes         [] No, pt required further clarification.          Goals  Short Term Goals  Time Frame for Short Term Goals: 3 weeks  Short Term Goal 1: Pt will be educated on her POC and HEP-met  Short Term Goal 2: Pt will initiate pump protocol-met    Long Term Goals  Time Frame for Long Term Goals : 6 weeks  Long Term Goal 1: Pt will be safe and independent with lymphedema management- in progress  Long Term Goal 2: Pt will demonstrate a 2-3cm decrease in BLE girth measurements in order to reduce any new or worsening skin conditions - progressing  Long Term Goal 3: Pt will be fitted for custom compression in order to management lymphedema-progressing  Long Term Goal 4: Pt will be fitted for an at home pump in order t o reduce BLE girth measurements and reduce pain- progressing      Patient Goals : \"to decrease BLE swelling and reduce pain\"        Minutes Tracking:  Time In: 0858  Time Out: 1021  Minutes: 80       Samm Sharma PTA   Date: 3/15/2023

## 2023-03-21 ENCOUNTER — APPOINTMENT (OUTPATIENT)
Dept: PHYSICAL THERAPY | Age: 78
End: 2023-03-21
Payer: MEDICARE

## 2023-03-24 ENCOUNTER — APPOINTMENT (OUTPATIENT)
Dept: PHYSICAL THERAPY | Age: 78
End: 2023-03-24
Payer: MEDICARE

## 2023-03-27 ENCOUNTER — APPOINTMENT (OUTPATIENT)
Dept: PHYSICAL THERAPY | Age: 78
End: 2023-03-27
Payer: MEDICARE

## 2023-03-29 ENCOUNTER — APPOINTMENT (OUTPATIENT)
Dept: PHYSICAL THERAPY | Age: 78
End: 2023-03-29
Payer: MEDICARE

## 2024-02-15 ENCOUNTER — TELEPHONE (OUTPATIENT)
Age: 79
End: 2024-02-15

## 2024-04-17 ENCOUNTER — CLINICAL DOCUMENTATION (OUTPATIENT)
Dept: PHYSICAL THERAPY | Age: 79
End: 2024-04-17